# Patient Record
Sex: MALE | Race: WHITE
[De-identification: names, ages, dates, MRNs, and addresses within clinical notes are randomized per-mention and may not be internally consistent; named-entity substitution may affect disease eponyms.]

---

## 2018-01-05 ENCOUNTER — HOSPITAL ENCOUNTER (INPATIENT)
Dept: HOSPITAL 92 - ERS | Age: 63
LOS: 2 days | Discharge: HOME | DRG: 189 | End: 2018-01-07
Attending: INTERNAL MEDICINE | Admitting: INTERNAL MEDICINE
Payer: MEDICARE

## 2018-01-05 VITALS — BODY MASS INDEX: 51.5 KG/M2

## 2018-01-05 DIAGNOSIS — I25.10: ICD-10-CM

## 2018-01-05 DIAGNOSIS — Z95.5: ICD-10-CM

## 2018-01-05 DIAGNOSIS — E87.5: ICD-10-CM

## 2018-01-05 DIAGNOSIS — E66.01: ICD-10-CM

## 2018-01-05 DIAGNOSIS — G62.9: ICD-10-CM

## 2018-01-05 DIAGNOSIS — J96.21: Primary | ICD-10-CM

## 2018-01-05 DIAGNOSIS — G47.33: ICD-10-CM

## 2018-01-05 DIAGNOSIS — E11.9: ICD-10-CM

## 2018-01-05 DIAGNOSIS — I11.0: ICD-10-CM

## 2018-01-05 DIAGNOSIS — I50.32: ICD-10-CM

## 2018-01-05 DIAGNOSIS — F17.210: ICD-10-CM

## 2018-01-05 DIAGNOSIS — J44.1: ICD-10-CM

## 2018-01-05 DIAGNOSIS — Z99.81: ICD-10-CM

## 2018-01-05 LAB
ALBUMIN SERPL BCG-MCNC: 4 G/DL (ref 3.4–4.8)
ALP SERPL-CCNC: 78 U/L (ref 40–150)
ALT SERPL W P-5'-P-CCNC: 23 U/L (ref 8–55)
ANION GAP SERPL CALC-SCNC: 17 MMOL/L (ref 10–20)
AST SERPL-CCNC: 17 U/L (ref 5–34)
BASOPHILS # BLD AUTO: 0 THOU/UL (ref 0–0.2)
BASOPHILS NFR BLD AUTO: 0 % (ref 0–1)
BILIRUB SERPL-MCNC: 0.4 MG/DL (ref 0.2–1.2)
BUN SERPL-MCNC: 22 MG/DL (ref 8.4–25.7)
CALCIUM SERPL-MCNC: 9.5 MG/DL (ref 7.8–10.44)
CHLORIDE SERPL-SCNC: 103 MMOL/L (ref 98–107)
CK MB SERPL-MCNC: 2.4 NG/ML (ref 0–6.6)
CO2 SERPL-SCNC: 21 MMOL/L (ref 23–31)
CREAT CL PREDICTED SERPL C-G-VRATE: 0 ML/MIN (ref 70–130)
EOSINOPHIL # BLD AUTO: 0 THOU/UL (ref 0–0.7)
EOSINOPHIL NFR BLD AUTO: 0.2 % (ref 0–10)
GLOBULIN SER CALC-MCNC: 2.7 G/DL (ref 2.4–3.5)
GLUCOSE SERPL-MCNC: 314 MG/DL (ref 80–115)
HGB BLD-MCNC: 14.2 G/DL (ref 14–18)
LIPASE SERPL-CCNC: 25 U/L (ref 8–78)
LYMPHOCYTES # BLD: 0.5 THOU/UL (ref 1.2–3.4)
LYMPHOCYTES NFR BLD AUTO: 5.6 % (ref 21–51)
MCH RBC QN AUTO: 26.7 PG (ref 27–31)
MCV RBC AUTO: 81.9 FL (ref 80–94)
MONOCYTES # BLD AUTO: 0.1 THOU/UL (ref 0.11–0.59)
MONOCYTES NFR BLD AUTO: 1.1 % (ref 0–10)
NEUTROPHILS # BLD AUTO: 8.2 THOU/UL (ref 1.4–6.5)
NEUTROPHILS NFR BLD AUTO: 93 % (ref 42–75)
PLATELET # BLD AUTO: 175 THOU/UL (ref 130–400)
POTASSIUM SERPL-SCNC: 3.7 MMOL/L (ref 3.5–5.1)
RBC # BLD AUTO: 5.34 MILL/UL (ref 4.7–6.1)
SODIUM SERPL-SCNC: 137 MMOL/L (ref 136–145)
TROPONIN I SERPL DL<=0.01 NG/ML-MCNC: 0.02 NG/ML (ref ?–0.03)
WBC # BLD AUTO: 8.8 THOU/UL (ref 4.8–10.8)

## 2018-01-05 PROCEDURE — 87040 BLOOD CULTURE FOR BACTERIA: CPT

## 2018-01-05 PROCEDURE — 71045 X-RAY EXAM CHEST 1 VIEW: CPT

## 2018-01-05 PROCEDURE — 36416 COLLJ CAPILLARY BLOOD SPEC: CPT

## 2018-01-05 PROCEDURE — 83690 ASSAY OF LIPASE: CPT

## 2018-01-05 PROCEDURE — 85025 COMPLETE CBC W/AUTO DIFF WBC: CPT

## 2018-01-05 PROCEDURE — A4216 STERILE WATER/SALINE, 10 ML: HCPCS

## 2018-01-05 PROCEDURE — 80048 BASIC METABOLIC PNL TOTAL CA: CPT

## 2018-01-05 PROCEDURE — 83880 ASSAY OF NATRIURETIC PEPTIDE: CPT

## 2018-01-05 PROCEDURE — 87804 INFLUENZA ASSAY W/OPTIC: CPT

## 2018-01-05 PROCEDURE — 82553 CREATINE MB FRACTION: CPT

## 2018-01-05 PROCEDURE — 94640 AIRWAY INHALATION TREATMENT: CPT

## 2018-01-05 PROCEDURE — 84484 ASSAY OF TROPONIN QUANT: CPT

## 2018-01-05 PROCEDURE — 80053 COMPREHEN METABOLIC PANEL: CPT

## 2018-01-05 PROCEDURE — 36415 COLL VENOUS BLD VENIPUNCTURE: CPT

## 2018-01-05 PROCEDURE — 93005 ELECTROCARDIOGRAM TRACING: CPT

## 2018-01-05 PROCEDURE — 99406 BEHAV CHNG SMOKING 3-10 MIN: CPT

## 2018-01-05 RX ADMIN — HYDROCODONE BITARTRATE AND ACETAMINOPHEN PRN TAB: 5; 325 TABLET ORAL at 22:37

## 2018-01-05 RX ADMIN — AZITHROMYCIN SCH MLS: 500 INJECTION, POWDER, LYOPHILIZED, FOR SOLUTION INTRAVENOUS at 22:10

## 2018-01-05 RX ADMIN — HEPARIN SODIUM SCH UNITS: 5000 INJECTION, SOLUTION INTRAVENOUS; SUBCUTANEOUS at 22:09

## 2018-01-05 RX ADMIN — CEFTRIAXONE SCH MLS: 1 INJECTION, POWDER, FOR SOLUTION INTRAMUSCULAR; INTRAVENOUS at 22:03

## 2018-01-05 NOTE — HP
REASON FOR ADMISSION:  COPD exacerbation, acute respiratory failure with 
hypoxia.

 

HISTORY OF PRESENTING ILLNESS:  The patient gives history of having worsening 
cough with wheezing from last 2 days.  This morning, it got worse.  The home 
health nurse came to check on him and told him to call his pulmonologist, Dr. Carroll.  Dr. Carroll was on vacation and the office told him to come to the 
Emergency Room.  He is normally on 2 L oxygen at home.  He uses CPAP at night.  
He has cough with expectoration of white phlegm.  He has not had any fever at 
home.  He has taken his flu shot for this year.  He uses electric chair and 
walks with a rolling walker for minimal distences inside the house.  He stays 
with his wife.  He has had some chest discomfort due to coughing episodes.

 

PAST MEDICAL AND SURGICAL HISTORY:  History of chronic respiratory failure with 
COPD, on home oxygen; CHF with diastolic dysfunction, morbid obesity, 
obstructive sleep apnea, on CPAP, has a LINQ recorder, history of toxoid in the 
past, coronary artery disease with prior stent and PTCA done to LAD in 2016, 
diabetes mellitus type 2 requiring insulin; chronic back pain, on narcotics; 
severe peripheral neuropathy, the reason for him to be wheelchair bound; 
hypertension, history of MI in , sinus surgery, cardiac cath x2.

 

CURRENT MEDICATIONS:  The patient takes Norvasc 10 mg p.o. daily, Singulair 10 
mg p.o. at bedtime, DuoNebs 4 times daily, hydralazine 25 mg twice daily, 
aspirin 81 mg daily, glipizide 10 mg twice daily, alendronate 70 mg p.o. once 
weekly, MiraLax 17 g daily, Lasix 40 mg twice daily, Prozac 40 mg q.a.m., 
lisinopril 10 mg daily, buspirone 5 mg twice daily, potassium chloride 20 mEq 
twice daily, spironolactone 12.5 mg twice daily, Restoril 30 mg p.o. at bedtime
, Protonix 40 mg daily, Zyrtec 10 mg p.o. daily, fluticasone nasal spray, 
Tessalon 100 mg 3 times daily, Lantus 27 units subcu daily, gabapentin 300 mg 3 
times daily, theophylline extended release 200 mg twice daily, Ultram p.r.n. 
twice dairy, Advair Diskus 500/50 mcg one puff twice daily.

 

ALLERGIES:  FLOMAX AND LEVAQUIN.

 

PERSONAL HISTORY:  Quit smoking in .  Does not abuse alcohol or drugs.  He 
lives with his wife.

 

FAMILY HISTORY:  Mom  in her 70s, she  of stroke and its complications.
  She has also had coronary artery disease.  Father  in his 60s, he has had 
COPD, heart failure, and hypertension.  Had older brother who  of COPD and 
its complications at the age of 63 years.

 

REVIEW OF SYSTEMS:  The following complete review of systems was

negative, unless otherwise mentioned in the HPI or below:

Constitutional:  Weight loss or gain, ability to conduct usual activities.

Skin:  Rash, itching.

Eyes:  Double vision, pain.

ENT/Mouth:  Nose bleeding, neck stiffness, pain, tenderness. 

Cardiovascular:  Palpitations, dyspnea on exertion, orthopnea.

 Respiratory:  Shortness of breath, wheezing, cough, hemoptysis, fever or night 
sweats.

Gastrointestinal:  Poor appetite, abdominal pain, heartburn, nausea, vomiting, 
constipation, or diarrhea.

Genitourinary:  Urgency, frequency, dysuria, nocturia.

Musculoskeletal:  Pain, swelling.

Neurologic/Psychiatric:  Anxiety, depression.

Allergy/Immunologic:  Skin rash, bleeding tendency.

 

PHYSICAL EXAMINATION:

GENERAL:  The patient is a 62-year-old male who is currently wheezing.

VITAL SIGNS:  Blood pressure 156/90, pulse 100 per minute, respiratory rate 24 
per minute, temperature 98.5 degrees Fahrenheit, and saturating 96% on 3 L 
nasal cannula.

NECK:  Supple.  No elevated JVD.

EYES:  Extraocular muscles are intact.  Pupils are reacting to light.  Oral 
cavity, mucous membranes are moist.  No exudate or congestion.

CARDIOVASCULAR SYSTEM:  S1, S2 heard.  Regular rhythm.

RESPIRATORY SYSTEM:  Air entry 1+ bilateral.  Scattered wheezes plus bilateral.

ABDOMEN:  Soft, bowel sounds heard.  No tenderness, rigidity or guarding.

EXTREMITIES:  There is 2+ peripheral edema.  No calf tenderness.

VASCULAR SYSTEM:  Peripheral pulses 1+ bilateral.  No ischemic ulcerations or 
gangrene.

CENTRAL NERVOUS SYSTEM:  No gross focal deficits seen.  The patient is alert, 
awake, oriented well.

PSYCHIATRIC SYSTEM:  The patient's mood is euthymic.  No hallucinations or 
delusions.

 

LABORATORY DATA AND X-RAY FINDINGS:  EKG done shows normal sinus rhythm at 98 
beats per minute with nonspecific ST-T wave changes.  White count of 8, 
hemoglobin and hematocrit 14 and 43, platelet count is 175, MCV is 81 with 93% 
neutrophils.  BUN 22, creatinine 1.6, glucose 314.  Serum bicarbonate 21.  
Liver enzymes within normal limits.  BNP is 70 and troponin I is within normal 
limits.  Lipase is 25.  Influenza A and B antigens are negative.  Chest x-ray 
done shows no acute infiltrate.

 

CLINICAL IMPRESSION AND PLAN:  The patient will be admitted to telemetry for 
acute on chronic obstructive pulmonary disease exacerbation, acute on chronic 
respiratory failure with hypoxia.  He will be placed on ceftriaxone and 
Zithromax as the patient is allergic to LEVAQUIN.  He will also be on Solu-
Medrol 40 mg IV q.6 hourly and DuoNebs q.6 hourly.  We will continue 
theophylline extended release as before.  We will continue aspirin, Plavix, 
Lipitor, Norvasc, buspirone, Lasix with potassium, gabapentin, Singulair, 
MiraLax as before.  His Restoril will be held in view of his lung condition at 
present.  We will place him on Levemir 40 units subcu at bedtime for now as the 
patient will be on steroids.  We will consult Dr. Davison who is on call for 
Dr. Carroll for Pulmonology.

 

[facility]

## 2018-01-05 NOTE — RAD
PORTABLE AP CHEST X-RAY

1/5/18

 

HISTORY: 

Dyspnea. Chest wall pain that started yesterday at work. Pain is increased with deep breathing and ar
m movement.

 

COMPARISON:  

9/18/17.

 

FINDINGS:  

A loop recorder again overlies the left mid chest. The cardiac silhouette and pulmonary vasculature a
re within normal limits. The lungs are clear. there has been no interval change from the prior exam. 


 

IMPRESSION:  

No acute cardiopulmonary process. 

 

POS: [facility]

## 2018-01-06 LAB
ALBUMIN SERPL BCG-MCNC: 3.9 G/DL (ref 3.4–4.8)
ALP SERPL-CCNC: 76 U/L (ref 40–150)
ALT SERPL W P-5'-P-CCNC: 22 U/L (ref 8–55)
ANION GAP SERPL CALC-SCNC: 22 MMOL/L (ref 10–20)
AST SERPL-CCNC: 25 U/L (ref 5–34)
BASOPHILS # BLD AUTO: 0 THOU/UL (ref 0–0.2)
BASOPHILS NFR BLD AUTO: 0 % (ref 0–1)
BILIRUB SERPL-MCNC: 0.4 MG/DL (ref 0.2–1.2)
BUN SERPL-MCNC: 25 MG/DL (ref 8.4–25.7)
CALCIUM SERPL-MCNC: 9.3 MG/DL (ref 7.8–10.44)
CHLORIDE SERPL-SCNC: 104 MMOL/L (ref 98–107)
CO2 SERPL-SCNC: 16 MMOL/L (ref 23–31)
CREAT CL PREDICTED SERPL C-G-VRATE: 95 ML/MIN (ref 70–130)
EOSINOPHIL # BLD AUTO: 0.1 THOU/UL (ref 0–0.7)
EOSINOPHIL NFR BLD AUTO: 1.3 % (ref 0–10)
GLOBULIN SER CALC-MCNC: 3.3 G/DL (ref 2.4–3.5)
GLUCOSE SERPL-MCNC: 339 MG/DL (ref 80–115)
HGB BLD-MCNC: 13.9 G/DL (ref 14–18)
LYMPHOCYTES # BLD: 0.6 THOU/UL (ref 1.2–3.4)
LYMPHOCYTES NFR BLD AUTO: 6.6 % (ref 21–51)
MCH RBC QN AUTO: 25.6 PG (ref 27–31)
MCV RBC AUTO: 84.4 FL (ref 80–94)
MONOCYTES # BLD AUTO: 0.1 THOU/UL (ref 0.11–0.59)
MONOCYTES NFR BLD AUTO: 1.2 % (ref 0–10)
NEUTROPHILS # BLD AUTO: 8 THOU/UL (ref 1.4–6.5)
NEUTROPHILS NFR BLD AUTO: 90.9 % (ref 42–75)
PLATELET # BLD AUTO: 218 THOU/UL (ref 130–400)
POTASSIUM SERPL-SCNC: 4.6 MMOL/L (ref 3.5–5.1)
RBC # BLD AUTO: 5.41 MILL/UL (ref 4.7–6.1)
SODIUM SERPL-SCNC: 137 MMOL/L (ref 136–145)
WBC # BLD AUTO: 8.8 THOU/UL (ref 4.8–10.8)

## 2018-01-06 RX ADMIN — INSULIN LISPRO PRN UNITS: 100 INJECTION, SOLUTION INTRAVENOUS; SUBCUTANEOUS at 13:07

## 2018-01-06 RX ADMIN — THEOPHYLLINE ANHYDROUS SCH MG: 80 LIQUID ORAL at 22:40

## 2018-01-06 RX ADMIN — INSULIN LISPRO PRN UNITS: 100 INJECTION, SOLUTION INTRAVENOUS; SUBCUTANEOUS at 08:03

## 2018-01-06 RX ADMIN — HEPARIN SODIUM SCH UNITS: 5000 INJECTION, SOLUTION INTRAVENOUS; SUBCUTANEOUS at 14:38

## 2018-01-06 RX ADMIN — HEPARIN SODIUM SCH UNITS: 5000 INJECTION, SOLUTION INTRAVENOUS; SUBCUTANEOUS at 08:02

## 2018-01-06 RX ADMIN — HEPARIN SODIUM SCH UNITS: 5000 INJECTION, SOLUTION INTRAVENOUS; SUBCUTANEOUS at 22:40

## 2018-01-06 RX ADMIN — THEOPHYLLINE ANHYDROUS SCH MG: 80 LIQUID ORAL at 10:13

## 2018-01-06 RX ADMIN — HYDROCODONE BITARTRATE AND ACETAMINOPHEN PRN TAB: 5; 325 TABLET ORAL at 11:21

## 2018-01-06 RX ADMIN — AZITHROMYCIN SCH MLS: 500 INJECTION, POWDER, LYOPHILIZED, FOR SOLUTION INTRAVENOUS at 22:40

## 2018-01-06 RX ADMIN — CEFTRIAXONE SCH MLS: 1 INJECTION, POWDER, FOR SOLUTION INTRAMUSCULAR; INTRAVENOUS at 22:39

## 2018-01-06 RX ADMIN — INSULIN LISPRO PRN UNITS: 100 INJECTION, SOLUTION INTRAVENOUS; SUBCUTANEOUS at 17:21

## 2018-01-06 NOTE — EKG
Test Reason : 

Blood Pressure : ***/*** mmHG

Vent. Rate : 098 BPM     Atrial Rate : 098 BPM

   P-R Int : 122 ms          QRS Dur : 092 ms

    QT Int : 394 ms       P-R-T Axes : 033 002 024 degrees

   QTc Int : 503 ms

 

Sinus rhythm with Premature atrial complexes

Possible Left atrial enlargement

Prolonged QT

Abnormal ECG

 

Confirmed by RE GARCIA, RADHA ([id number]),  SHAY THURSTON ([id number]) on 1/6/2018 3:39:04 PM

 

Referred By:             Confirmed By:RADHA GRIMALDO MD

## 2018-01-06 NOTE — PDOC.PN
- Subjective


Encounter Start Date: 01/06/18


Encounter Start Time: 12:00





Mr. Moya says he feels much better. He says he is breathing easier, and his 

cough has improved. He says he had trouble sleeping, and would like his 

sleeping medication tonight.





- Objective


Resuscitation Status: 


 











Resuscitation Status           FULL:Full Resuscitation














MAR Reviewed: Yes


Vital Signs & Weight: 


 Vital Signs (12 hours)











  Temp Pulse Resp BP Pulse Ox


 


 01/06/18 08:01   103 H   


 


 01/06/18 07:54  97.7 F  103 H  20  177/91 H  90 L


 


 01/06/18 07:10      97


 


 01/06/18 07:07   95  20   97


 


 01/06/18 04:00  97.8 F  103 H  16  172/105 H  91 L


 


 01/06/18 00:02   100  20   97








 Weight











Weight                         329 lb














I&O: 


 











 01/05/18 01/06/18 01/07/18





 06:59 06:59 06:59


 


Intake Total  240 


 


Output Total  1500 


 


Balance  -1260 











Result Diagrams: 


 01/06/18 04:06





 01/06/18 04:06


Additional Labs: 


 Accuchecks











  01/06/18 01/05/18





  05:59 21:52


 


POC Glucose  276 H  385 H














Phys Exam





- Physical Examination


HEENT: PERRLA


Respiratory: no wheezing


decreased air movement


Cardiovascular: RRR, no significant murmur


Gastrointestinal: soft, non-tender, positive bowel sounds


Musculoskeletal: edema present


2+ edema, bilaterally with chronic venous stasis changes





Dx/Plan


(1) Acute and chronic respiratory failure with hypoxia


Code(s): J96.21 - ACUTE AND CHRONIC RESPIRATORY FAILURE WITH HYPOXIA   Status: 

Acute   Comment: Continue pulmonary support, Duonebs, Spiriva, Symbicort, 

Solumedrol, O2, Rocephin d/c'd, Doxycycline 100mg BID   





(2) COPD exacerbation


Code(s): J44.1 - CHRONIC OBSTRUCTIVE PULMONARY DISEASE W (ACUTE) EXACERBATION   

Status: Chronic   Comment: See #1   





(3) DM type 2 (diabetes mellitus, type 2)


Status: Chronic   





(4) Hypertension


Code(s): I10 - ESSENTIAL (PRIMARY) HYPERTENSION   Status: Chronic   


Qualifiers: 


 





(5) Morbid obesity


Code(s): E66.01 - MORBID (SEVERE) OBESITY DUE TO EXCESS CALORIES   Status: 

Chronic   





- Plan





* Acute on chronic respiratory failure with hypoxemia due to COPD exacerbation- 

improved overnight


* Will change to oral steroids, and re-assess in the AM


* Continue Rocephin and Azithromycin, as well as Duonebs


* DM- blood glucose is elevated- likely from steroids- will re-start Glyburide


* HTN- blood pressure is elevated- will  re-start Hydralazine and monitor.

## 2018-01-07 VITALS — TEMPERATURE: 97.9 F

## 2018-01-07 VITALS — SYSTOLIC BLOOD PRESSURE: 167 MMHG | DIASTOLIC BLOOD PRESSURE: 93 MMHG

## 2018-01-07 LAB
ANION GAP SERPL CALC-SCNC: 14 MMOL/L (ref 10–20)
BASOPHILS # BLD AUTO: 0 THOU/UL (ref 0–0.2)
BASOPHILS NFR BLD AUTO: 0.1 % (ref 0–1)
BUN SERPL-MCNC: 30 MG/DL (ref 8.4–25.7)
CALCIUM SERPL-MCNC: 9 MG/DL (ref 7.8–10.44)
CHLORIDE SERPL-SCNC: 106 MMOL/L (ref 98–107)
CO2 SERPL-SCNC: 24 MMOL/L (ref 23–31)
CREAT CL PREDICTED SERPL C-G-VRATE: 99 ML/MIN (ref 70–130)
EOSINOPHIL # BLD AUTO: 0 THOU/UL (ref 0–0.7)
EOSINOPHIL NFR BLD AUTO: 0.1 % (ref 0–10)
GLUCOSE SERPL-MCNC: 142 MG/DL (ref 80–115)
HGB BLD-MCNC: 13.1 G/DL (ref 14–18)
LYMPHOCYTES # BLD: 1.5 THOU/UL (ref 1.2–3.4)
LYMPHOCYTES NFR BLD AUTO: 11.6 % (ref 21–51)
MCH RBC QN AUTO: 25.9 PG (ref 27–31)
MCV RBC AUTO: 82.4 FL (ref 80–94)
MONOCYTES # BLD AUTO: 1 THOU/UL (ref 0.11–0.59)
MONOCYTES NFR BLD AUTO: 7.7 % (ref 0–10)
NEUTROPHILS # BLD AUTO: 10.6 THOU/UL (ref 1.4–6.5)
NEUTROPHILS NFR BLD AUTO: 80.5 % (ref 42–75)
PLATELET # BLD AUTO: 200 THOU/UL (ref 130–400)
POTASSIUM SERPL-SCNC: 3.2 MMOL/L (ref 3.5–5.1)
RBC # BLD AUTO: 5.05 MILL/UL (ref 4.7–6.1)
SODIUM SERPL-SCNC: 141 MMOL/L (ref 136–145)
WBC # BLD AUTO: 13.2 THOU/UL (ref 4.8–10.8)

## 2018-01-07 RX ADMIN — HEPARIN SODIUM SCH UNITS: 5000 INJECTION, SOLUTION INTRAVENOUS; SUBCUTANEOUS at 09:29

## 2018-01-07 RX ADMIN — THEOPHYLLINE ANHYDROUS SCH MG: 80 LIQUID ORAL at 14:29

## 2018-01-07 RX ADMIN — HEPARIN SODIUM SCH UNITS: 5000 INJECTION, SOLUTION INTRAVENOUS; SUBCUTANEOUS at 14:31

## 2018-01-07 NOTE — DIS
PRIMARY CARE PHYSICIAN:  Stepahn Garcia D.O.

 

DATE OF ADMISSION:  01/05/2018

 

DATE OF DISCHARGE:  01/07/2018

 

DISCHARGE DISPOSITION:  Home.

 

PRIMARY DISCHARGE DIAGNOSES:

1.  Chronic obstructive pulmonary disease exacerbation.

2.  Diabetes mellitus, type 2.

3.  Obstructive sleep apnea on CPAP.

4.  History of coronary artery disease with stent.

5.  Severe peripheral neuropathy.

6.  Chronic diastolic heart failure and the patient also has history of chronic respiratory failure w
ith hypoxemia on home O2.

7.  Morbid obesity.

 

CODE STATUS:  FULL CODE.

 

ALLERGIES:  LEVAQUIN and FLOMAX.

 

HOSPITAL COURSE:  Mr. Moya is a pleasant 62-year-old gentleman who came to the emergency room with c
omplaints of shortness of breath.  He was found to be hypoxic.  He had been trying to treat himself a
t home without relief.  As a result, he came to the emergency room where he was admitted with a COPD 
exacerbation.  Chest x-ray showed no acute pulmonary process such as pneumonia.  He was treated with 
empiric IV antibiotics as well as IV steroids and DuoNebs and cough suppressants.  He was improved af
ter a few days in the hospital and was subsequently able to be discharged home; however, he will need
 close outpatient follow up hopefully within the week as he does have a history of severe COPD and to
 follow up again within 1-2 days with his primary care physician.

## 2018-01-07 NOTE — PDOC.PN
- Subjective


Encounter Start Date: 01/07/18


Encounter Start Time: 13:59





 was seen today in follow-up of Acute on chronic respiratory failure. 

He says he feels much better. He feels he is at his baseline.





- Objective


Resuscitation Status: 


 











Resuscitation Status           FULL:Full Resuscitation














MAR Reviewed: Yes


Vital Signs & Weight: 


 Vital Signs (12 hours)











  Temp Pulse Resp BP BP Pulse Ox


 


 01/07/18 09:27   87   167/93 H  


 


 01/07/18 09:26   87   167/93 H  


 


 01/07/18 08:00  97.9 F  87  22 H   167/93 H  96


 


 01/07/18 04:35  97.9 F  87  20   164/102 H  94 L








 Weight











Weight                         329 lb














I&O: 


 











 01/06/18 01/07/18 01/08/18





 06:59 06:59 06:59


 


Intake Total 1720  


 


Output Total 2200  


 


Balance -480  











Result Diagrams: 


 01/07/18 03:55





 01/07/18 03:55


Additional Labs: 


 Accuchecks











  01/07/18 01/07/18 01/06/18





  12:25 04:34 16:57


 


POC Glucose  117 H  130 H  280 H














Phys Exam





- Physical Examination


HEENT: PERRLA


Respiratory: no rales, wheezing present


Cardiovascular: RRR, no significant murmur, no rub


Gastrointestinal: soft, non-tender, positive bowel sounds


Musculoskeletal: edema present


trace pedal edema





Dx/Plan


(1) Acute and chronic respiratory failure with hypoxia


Code(s): J96.21 - ACUTE AND CHRONIC RESPIRATORY FAILURE WITH HYPOXIA   Status: 

Acute   Comment: Continue pulmonary support, Duonebs, Spiriva, Symbicort, 

Solumedrol, O2, Rocephin d/c'd, Doxycycline 100mg BID   





(2) COPD exacerbation


Code(s): J44.1 - CHRONIC OBSTRUCTIVE PULMONARY DISEASE W (ACUTE) EXACERBATION   

Status: Chronic   Comment: See #1   





(3) DM type 2 (diabetes mellitus, type 2)


Status: Chronic   





(4) Hypertension


Code(s): I10 - ESSENTIAL (PRIMARY) HYPERTENSION   Status: Chronic   


Qualifiers: 


 





(5) Morbid obesity


Code(s): E66.01 - MORBID (SEVERE) OBESITY DUE TO EXCESS CALORIES   Status: 

Chronic   





- Plan





* Acute on chronic respiratory failure- improving


* DM- blood glucose is stable


* He can be transitioned to oral antibiotics, and plan on discharge home today 

with close Outpatient Follow-up..

## 2018-01-31 ENCOUNTER — HOSPITAL ENCOUNTER (INPATIENT)
Dept: HOSPITAL 92 - ERS | Age: 63
LOS: 6 days | Discharge: HOME HEALTH | DRG: 871 | End: 2018-02-06
Attending: INTERNAL MEDICINE | Admitting: INTERNAL MEDICINE
Payer: MEDICARE

## 2018-01-31 VITALS — BODY MASS INDEX: 52.2 KG/M2

## 2018-01-31 DIAGNOSIS — E87.6: ICD-10-CM

## 2018-01-31 DIAGNOSIS — G47.33: ICD-10-CM

## 2018-01-31 DIAGNOSIS — I27.20: ICD-10-CM

## 2018-01-31 DIAGNOSIS — K56.7: ICD-10-CM

## 2018-01-31 DIAGNOSIS — K59.09: ICD-10-CM

## 2018-01-31 DIAGNOSIS — I50.33: ICD-10-CM

## 2018-01-31 DIAGNOSIS — Z87.891: ICD-10-CM

## 2018-01-31 DIAGNOSIS — E87.1: ICD-10-CM

## 2018-01-31 DIAGNOSIS — G89.4: ICD-10-CM

## 2018-01-31 DIAGNOSIS — Z95.5: ICD-10-CM

## 2018-01-31 DIAGNOSIS — M54.9: ICD-10-CM

## 2018-01-31 DIAGNOSIS — J13: ICD-10-CM

## 2018-01-31 DIAGNOSIS — N18.3: ICD-10-CM

## 2018-01-31 DIAGNOSIS — E66.01: ICD-10-CM

## 2018-01-31 DIAGNOSIS — Z99.81: ICD-10-CM

## 2018-01-31 DIAGNOSIS — E11.40: ICD-10-CM

## 2018-01-31 DIAGNOSIS — J44.1: ICD-10-CM

## 2018-01-31 DIAGNOSIS — A40.3: Primary | ICD-10-CM

## 2018-01-31 DIAGNOSIS — J96.10: ICD-10-CM

## 2018-01-31 DIAGNOSIS — E11.22: ICD-10-CM

## 2018-01-31 DIAGNOSIS — I13.0: ICD-10-CM

## 2018-01-31 DIAGNOSIS — I25.10: ICD-10-CM

## 2018-01-31 LAB
ALBUMIN SERPL BCG-MCNC: 3.9 G/DL (ref 3.4–4.8)
ALP SERPL-CCNC: 80 U/L (ref 40–150)
ALT SERPL W P-5'-P-CCNC: 16 U/L (ref 8–55)
ANALYZER IN CARDIO: (no result)
ANION GAP SERPL CALC-SCNC: 17 MMOL/L (ref 10–20)
APTT PPP: 30.4 SEC (ref 22.9–36.1)
AST SERPL-CCNC: 13 U/L (ref 5–34)
BACTERIA UR QL AUTO: (no result) HPF
BASE EXCESS STD BLDA CALC-SCNC: 0.1 MEQ/L
BASOPHILS # BLD AUTO: 0.1 THOU/UL (ref 0–0.2)
BASOPHILS NFR BLD AUTO: 0.4 % (ref 0–1)
BILIRUB SERPL-MCNC: 0.6 MG/DL (ref 0.2–1.2)
BUN SERPL-MCNC: 19 MG/DL (ref 8.4–25.7)
CA-I BLDA-SCNC: 1.2 MMOL/L (ref 1.12–1.3)
CALCIUM SERPL-MCNC: 9.2 MG/DL (ref 7.8–10.44)
CHLORIDE SERPL-SCNC: 99 MMOL/L (ref 98–107)
CK MB SERPL-MCNC: 1.6 NG/ML (ref 0–6.6)
CK SERPL-CCNC: 255 U/L (ref 30–200)
CO2 SERPL-SCNC: 23 MMOL/L (ref 23–31)
CREAT CL PREDICTED SERPL C-G-VRATE: 0 ML/MIN (ref 70–130)
CRYSTAL-AUWI FLAG: 0 (ref 0–15)
EOSINOPHIL # BLD AUTO: 0.1 THOU/UL (ref 0–0.7)
EOSINOPHIL NFR BLD AUTO: 0.3 % (ref 0–10)
GLOBULIN SER CALC-MCNC: 2.9 G/DL (ref 2.4–3.5)
GLUCOSE SERPL-MCNC: 371 MG/DL (ref 80–115)
GLUCOSE UR STRIP-MCNC: 500 MG/DL
HCO3 BLDA-SCNC: 24.2 MEQ/L (ref 22–26)
HCT VFR BLDA CALC: 47.8 % (ref 42–52)
HEV IGM SER QL: 0.6 (ref 0–7.99)
HGB BLD-MCNC: 14.6 G/DL (ref 14–18)
HGB BLDA-MCNC: 14.3 G/DL (ref 14–18)
HYALINE CASTS #/AREA URNS LPF: (no result) LPF
INR PPP: 1
LIPASE SERPL-CCNC: 27 U/L (ref 8–78)
LYMPHOCYTES # BLD: 1.7 THOU/UL (ref 1.2–3.4)
LYMPHOCYTES NFR BLD AUTO: 9.2 % (ref 21–51)
MCH RBC QN AUTO: 27.2 PG (ref 27–31)
MCV RBC AUTO: 83.6 FL (ref 80–94)
MONOCYTES # BLD AUTO: 1 THOU/UL (ref 0.11–0.59)
MONOCYTES NFR BLD AUTO: 5.3 % (ref 0–10)
NEUTROPHILS # BLD AUTO: 15.8 THOU/UL (ref 1.4–6.5)
NEUTROPHILS NFR BLD AUTO: 84.9 % (ref 42–75)
PATHC CAST-AUWI FLAG: 0 (ref 0–2.49)
PCO2 BLDA: 37.6 MMHG (ref 35–45)
PH BLDA: 7.43 [PH] (ref 7.35–7.45)
PLATELET # BLD AUTO: 195 THOU/UL (ref 130–400)
PO2 BLDA: 85.3 MMHG (ref 80–100)
POTASSIUM SERPL-SCNC: 3.9 MMOL/L (ref 3.5–5.1)
PROT UR STRIP.AUTO-MCNC: 30 MG/DL
PROTHROMBIN TIME: 12.9 SEC (ref 12–14.7)
RBC # BLD AUTO: 5.36 MILL/UL (ref 4.7–6.1)
SODIUM SERPL-SCNC: 135 MMOL/L (ref 136–145)
SP GR UR STRIP: 1.01 (ref 1–1.04)
SPECIMEN DRAWN FROM PATIENT: (no result)
SPERM-AUWI FLAG: 0 (ref 0–9.9)
TROPONIN I SERPL DL<=0.01 NG/ML-MCNC: 0.04 NG/ML (ref ?–0.03)
TROPONIN I SERPL DL<=0.01 NG/ML-MCNC: 0.04 NG/ML (ref ?–0.03)
WBC # BLD AUTO: 18.6 THOU/UL (ref 4.8–10.8)
WBC UR QL AUTO: (no result) HPF (ref 0–3)
YEAST-AUWI FLAG: 0 (ref 0–25)

## 2018-01-31 PROCEDURE — 96375 TX/PRO/DX INJ NEW DRUG ADDON: CPT

## 2018-01-31 PROCEDURE — 80048 BASIC METABOLIC PNL TOTAL CA: CPT

## 2018-01-31 PROCEDURE — 94640 AIRWAY INHALATION TREATMENT: CPT

## 2018-01-31 PROCEDURE — 80053 COMPREHEN METABOLIC PANEL: CPT

## 2018-01-31 PROCEDURE — 83690 ASSAY OF LIPASE: CPT

## 2018-01-31 PROCEDURE — 93970 EXTREMITY STUDY: CPT

## 2018-01-31 PROCEDURE — 85018 HEMOGLOBIN: CPT

## 2018-01-31 PROCEDURE — 81003 URINALYSIS AUTO W/O SCOPE: CPT

## 2018-01-31 PROCEDURE — 83605 ASSAY OF LACTIC ACID: CPT

## 2018-01-31 PROCEDURE — 74018 RADEX ABDOMEN 1 VIEW: CPT

## 2018-01-31 PROCEDURE — 80069 RENAL FUNCTION PANEL: CPT

## 2018-01-31 PROCEDURE — 36415 COLL VENOUS BLD VENIPUNCTURE: CPT

## 2018-01-31 PROCEDURE — 87040 BLOOD CULTURE FOR BACTERIA: CPT

## 2018-01-31 PROCEDURE — 36416 COLLJ CAPILLARY BLOOD SPEC: CPT

## 2018-01-31 PROCEDURE — 74019 RADEX ABDOMEN 2 VIEWS: CPT

## 2018-01-31 PROCEDURE — 85610 PROTHROMBIN TIME: CPT

## 2018-01-31 PROCEDURE — 82553 CREATINE MB FRACTION: CPT

## 2018-01-31 PROCEDURE — 85049 AUTOMATED PLATELET COUNT: CPT

## 2018-01-31 PROCEDURE — 85014 HEMATOCRIT: CPT

## 2018-01-31 PROCEDURE — 84484 ASSAY OF TROPONIN QUANT: CPT

## 2018-01-31 PROCEDURE — 83880 ASSAY OF NATRIURETIC PEPTIDE: CPT

## 2018-01-31 PROCEDURE — 96365 THER/PROPH/DIAG IV INF INIT: CPT

## 2018-01-31 PROCEDURE — 93306 TTE W/DOPPLER COMPLETE: CPT

## 2018-01-31 PROCEDURE — A4216 STERILE WATER/SALINE, 10 ML: HCPCS

## 2018-01-31 PROCEDURE — 85025 COMPLETE CBC W/AUTO DIFF WBC: CPT

## 2018-01-31 PROCEDURE — 93005 ELECTROCARDIOGRAM TRACING: CPT

## 2018-01-31 PROCEDURE — 82805 BLOOD GASES W/O2 SATURATION: CPT

## 2018-01-31 PROCEDURE — 81015 MICROSCOPIC EXAM OF URINE: CPT

## 2018-01-31 PROCEDURE — 85730 THROMBOPLASTIN TIME PARTIAL: CPT

## 2018-01-31 PROCEDURE — 93798 PHYS/QHP OP CAR RHAB W/ECG: CPT

## 2018-01-31 PROCEDURE — 94760 N-INVAS EAR/PLS OXIMETRY 1: CPT

## 2018-01-31 PROCEDURE — 83735 ASSAY OF MAGNESIUM: CPT

## 2018-01-31 PROCEDURE — 96367 TX/PROPH/DG ADDL SEQ IV INF: CPT

## 2018-01-31 PROCEDURE — 71045 X-RAY EXAM CHEST 1 VIEW: CPT

## 2018-01-31 PROCEDURE — 94644 CONT INHLJ TX 1ST HOUR: CPT

## 2018-01-31 RX ADMIN — INSULIN HUMAN PRN UNIT: 100 INJECTION, SOLUTION PARENTERAL at 22:01

## 2018-01-31 NOTE — RAD
PORTABLE CHEST:

1/31/18

 

COMPARISON:  

1/5/18 study.

 

HISTORY: 

Shortness of breath. 

 

FINDINGS:  

Heart size is within normal limits. There is some atherosclerotic changes of the aorta. The lungs sarah
w some chronic change without focal infiltrates. Some questionable slight blunting to the left costop
hrenic angle. This could be technique related. 

 

IMPRESSION:  

Slight blunting to the left costophrenic angle. This could indicate a small effusion. PA and lateral 
chest film would be suggested if indicated. This could just be related to technique and overlap of ri
bs and soft tissues. 

 

 

POS: [facility]

## 2018-01-31 NOTE — HP
DATE OF ADMISSION:  01/31/2018

 

PRIMARY CARE PHYSICIAN:  Dr. Stephan Garcia.

 

PRIMARY CARDIOLOGIST:  The patient has seen Dr. Elliott in the past.

 

CHIEF COMPLAINT:  Shortness of breath.

 

HISTORY OF PRESENT ILLNESS:  The patient is a 62-year-old male with morbid obesity; COPD; chronic treasure
stolic heart failure; obstructive sleep apnea, on CPAP; chronic respiratory failure, on home O2; dequan
nary artery disease, status post stent; and diabetes mellitus, type 2; presented to the emergency [facility] with above complaints.  The patient was discharged from this facility approximately 3 weeks ago wit
h a diagnosis of COPD exacerbation.

 

Over the last one week, the patient has gradual worsening shortness of breath along with wheezing and
 productive cough.  He has gained around 11 pounds in the last 10 days or so.  He states that he is c
ompliant with fluid restriction and his medications.  He was short of breath on minimal exertion.  He
 denies any fevers, chills, or sick contacts.  No chest pain, palpitations, diaphoresis, nausea or vo
miting reported.

 

In the emergency room, his initial vital signs showed temperature 98.6, respiration of 22, pulse rate
 of 108 with a blood pressure of 199/106 with O2 saturation of 100% on nonrebreather.  His chest x-ra
y was negative for infiltrate.  It showed small bilateral pleural effusion.  His EKG showed sinus tac
hycardia with left axis deviation and PVCs.  He received IV fluids, aspirin, ceftriaxone, Decadron, D
uoNebs, and nebulizer treatment along with nitro patch in the emergency room.

 

PAST MEDICAL HISTORY:

1.  Chronic diastolic heart failure.

2.  Obstructive sleep apnea, on CPAP.

3.  Chronic respiratory failure, on home oxygen.

4.  Morbid obesity.

5.  Diabetes mellitus, type 2.

6.  Hypertension.

7.  Dyslipidemia.

8.  Coronary artery disease, status post myocardial infarction and stent placement.

9.  Pulmonary hypertension.

10.  Chronic obstructive pulmonary disease.

11.  Peripheral neuropathy.

12.  Chronic low back pain.

 

PAST SURGICAL HISTORY:

1.  Cardiac catheterization.

2.  Sinus surgery.

3.  Coronary stent placement in 2016.

 

ALLERGIES:  The patient is allergic to FLOMAX that causes nausea and LEVAQUIN that causes cardiac pro
blems.

 

SOCIAL HISTORY:  The patient is a former smoker, quit in 1997.  He is disabled, lives at home, FULL C
ODE, makes his own decisions with the help of his family.

 

FAMILY HISTORY:  Positive for father with heart disease and mother with stroke.

 

REVIEW OF SYSTEMS:  The following complete review of systems was negative, unless otherwise mentioned
 in the HPI or below:

Constitutional:  Weight loss or gain, ability to conduct usual activities.

Skin:  Rash, itching.

Eyes:  Double vision, pain.

ENT/Mouth:  Nose bleeding, neck stiffness, pain, tenderness.

Cardiovascular:  Palpitations, dyspnea on exertion, orthopnea.

Respiratory:  Shortness of breath, wheezing, cough, hemoptysis, fever or night sweats.

Gastrointestinal:  Poor appetite, abdominal pain, heartburn, nausea, vomiting, constipation, or diarr
hea.

Genitourinary:  Urgency, frequency, dysuria, nocturia.

Musculoskeletal:  Pain, swelling.

Neurologic/Psychiatric:  Anxiety, depression.

Allergy/Immunologic:  Skin rash, bleeding tendency.

 

HOME MEDICATIONS:  The patient is unable to recall any of his home medications.  Family to bring accu
rate list of medications later today.

 

PHYSICAL EXAMINATION:

VITAL SIGNS:  As discussed above.

GENERAL:  A 62-year-old male, morbidly obese, in mild respiratory distress, able to complete short ph
rases.

HEENT:  Head atraumatic, normocephalic.  Sclerae are anicteric.  Moist mucous membrane, no oral lesio
n.

NECK:  Supple.  JVD cannot be appreciated due to body habitus.

LUNGS:  Showed scattered wheezing with bibasilar rales.

HEART:  S1, S2 present.  Regular rate and rhythm, tachycardic, no heaves or pulsation.

ABDOMEN:  Obese, soft.  Bowel sounds present.

EXTREMITIES:  4+ edema in bilateral lower extremities with some calf tenderness.

SKIN:  Warm and dry.

LYMPH NODES:  No palpable lymph nodes in the neck.

PERIPHERAL VASCULAR:  Radial pulses palpable bilaterally.

MUSCULOSKELETAL:  No joint swelling or tenderness.

NEUROLOGIC:  Grossly nonfocal, moves all four extremities.

PSYCHIATRY:  Alert, awake, oriented x3.

 

LABORATORY AND X-RAY FINDINGS:

1.  CBC showed WBC 18.6 with a platelet count of 195, hemoglobin 14.6.

2.  PT, INR, PTT are in normal range.

3.  Blood gases showed pH 7.43 with pCO2 of 37.6, pO2 85.3.

4.  Chemistries showed sodium 135, potassium 3.9, chloride 99, bicarbonate 23, BUN 19, creatinine 1.7
5, glucose 371.  Lactic acid 3.2.

5.  Troponin of 0.036.

6.  CK was 255, lipase was normal.

7.  Influenza testing was negative.

8.  Chest x-ray and EKG by my review as discussed above.

 

IMPRESSION:

1.  Acute on chronic diastolic heart failure.

2.  Chronic obstructive pulmonary disease exacerbation.

3.  Sepsis secondary to suspected pneumonia, questionable pneumococcal.

4.  Hypertension.

5.  Obstructive sleep apnea, on CPAP.

6.  Morbid obesity.

7.  Diabetes mellitus, type 2.

8.  Chronic respiratory failure, on home oxygen.

9.  Coronary artery disease, status post stent placement.

10.  Diabetic neuropathy.

11.  Chronic pain syndrome.

12.  Hyponatremia.

13.  Chronic kidney disease, stage 3.

14.  Elevated troponins, probably secondary to congestive heart failure.

 

PLAN:  The patient will be monitored in the telemetry unit.  The patient has elevated lactic acid.  H
e is intravascularly depleted and has third spacing.  He will probably benefit from gentle diuresis w
ith Lasix drip.  We will also continue nebulizer treatment with low dose steroids.  Cardiology will b
e consulted.  His last echocardiogram was in August of last year.  His last cardiac catheterization w
as in 08/2016.  He had LAD stent placement at that time.  We will get serial cardiac enzymes.  Repeat
 lactic acid in a.m.  Due to bilateral lower extremity swelling and pain, we will rule out deep venou
s thrombosis.  We will continue aspirin and Plavix.  Insulin sliding scale.  Resume home CPAP.  Monit
or renal function closely.  Resume his home pain regimen once confirmed.

 

Plan of care was discussed with the patient in detail.  He stated understanding.

## 2018-02-01 LAB
ALBUMIN SERPL BCG-MCNC: 3.7 G/DL (ref 3.4–4.8)
ANION GAP SERPL CALC-SCNC: 16 MMOL/L (ref 10–20)
ANION GAP SERPL CALC-SCNC: 16 MMOL/L (ref 10–20)
BASOPHILS # BLD AUTO: 0 THOU/UL (ref 0–0.2)
BASOPHILS NFR BLD AUTO: 0 % (ref 0–1)
BUN SERPL-MCNC: 21 MG/DL (ref 8.4–25.7)
BUN SERPL-MCNC: 24 MG/DL (ref 8.4–25.7)
BUN/CREAT SERPL: 11.8
CALCIUM SERPL-MCNC: 8.9 MG/DL (ref 7.8–10.44)
CALCIUM SERPL-MCNC: 9 MG/DL (ref 7.8–10.44)
CHLORIDE SERPL-SCNC: 100 MMOL/L (ref 98–107)
CHLORIDE SERPL-SCNC: 102 MMOL/L (ref 98–107)
CO2 SERPL-SCNC: 22 MMOL/L (ref 23–31)
CO2 SERPL-SCNC: 27 MMOL/L (ref 23–31)
CREAT CL PREDICTED SERPL C-G-VRATE: 92 ML/MIN (ref 70–130)
CREAT CL PREDICTED SERPL C-G-VRATE: 97 ML/MIN (ref 70–130)
EOSINOPHIL # BLD AUTO: 0 THOU/UL (ref 0–0.7)
EOSINOPHIL NFR BLD AUTO: 0.2 % (ref 0–10)
GLUCOSE SERPL-MCNC: 314 MG/DL (ref 80–115)
GLUCOSE SERPL-MCNC: 422 MG/DL (ref 80–115)
HGB BLD-MCNC: 13.7 G/DL (ref 14–18)
LYMPHOCYTES # BLD: 0.7 THOU/UL (ref 1.2–3.4)
LYMPHOCYTES NFR BLD AUTO: 4.4 % (ref 21–51)
MAGNESIUM SERPL-MCNC: 2.2 MG/DL (ref 1.6–2.6)
MCH RBC QN AUTO: 27 PG (ref 27–31)
MCV RBC AUTO: 84.4 FL (ref 80–94)
MONOCYTES # BLD AUTO: 0.3 THOU/UL (ref 0.11–0.59)
MONOCYTES NFR BLD AUTO: 1.9 % (ref 0–10)
NEUTROPHILS # BLD AUTO: 15.4 THOU/UL (ref 1.4–6.5)
NEUTROPHILS NFR BLD AUTO: 93.5 % (ref 42–75)
PLATELET # BLD AUTO: 174 THOU/UL (ref 130–400)
POTASSIUM SERPL-SCNC: 3.5 MMOL/L (ref 3.5–5.1)
POTASSIUM SERPL-SCNC: 4 MMOL/L (ref 3.5–5.1)
RBC # BLD AUTO: 5.06 MILL/UL (ref 4.7–6.1)
SODIUM SERPL-SCNC: 136 MMOL/L (ref 136–145)
SODIUM SERPL-SCNC: 139 MMOL/L (ref 136–145)
TROPONIN I SERPL DL<=0.01 NG/ML-MCNC: 0.03 NG/ML (ref ?–0.03)
WBC # BLD AUTO: 16.5 THOU/UL (ref 4.8–10.8)

## 2018-02-01 RX ADMIN — Medication SCH ML: at 20:42

## 2018-02-01 RX ADMIN — HYDROCODONE BITARTRATE AND ACETAMINOPHEN PRN TAB: 5; 325 TABLET ORAL at 18:01

## 2018-02-01 RX ADMIN — ASPIRIN SCH MG: 81 TABLET ORAL at 08:09

## 2018-02-01 RX ADMIN — INSULIN HUMAN PRN UNIT: 100 INJECTION, SOLUTION PARENTERAL at 02:46

## 2018-02-01 RX ADMIN — INSULIN HUMAN PRN UNIT: 100 INJECTION, SOLUTION PARENTERAL at 20:43

## 2018-02-01 RX ADMIN — INSULIN HUMAN PRN UNIT: 100 INJECTION, SOLUTION PARENTERAL at 18:04

## 2018-02-01 RX ADMIN — INSULIN HUMAN PRN UNIT: 100 INJECTION, SOLUTION PARENTERAL at 06:33

## 2018-02-01 RX ADMIN — Medication SCH ML: at 08:10

## 2018-02-01 RX ADMIN — INSULIN HUMAN PRN UNIT: 100 INJECTION, SOLUTION PARENTERAL at 12:41

## 2018-02-01 NOTE — PDOC.PN
- Subjective


Encounter Start Date: 02/01/18


Encounter Start Time: 11:00





Patient seen and examined. SOB improving. Dry cough. No fever. No overnight 

events





- Objective


Resuscitation Status: 


 











Resuscitation Status           FULL:Full Resuscitation














MAR Reviewed: Yes


Vital Signs & Weight: 


 Vital Signs (12 hours)











  Temp Pulse Resp BP Pulse Ox


 


 02/01/18 20:00  98.9 F  102 H  18  139/78  95


 


 02/01/18 18:51      99


 


 02/01/18 18:49   99  16   95


 


 02/01/18 16:00  98.2 F  95  20  167/103 H  94 L


 


 02/01/18 14:16   88  16   96


 


 02/01/18 12:00  97.5 F L  89  18  165/90 H  98


 


 02/01/18 10:35   91  18   91 L








 Weight











Weight                         332 lb 4.8 oz














I&O: 


 











 01/31/18 02/01/18 02/02/18





 06:59 06:59 06:59


 


Intake Total  694.5 1030


 


Output Total  2425 2700


 


Balance  -1730.5 -1670











Result Diagrams: 


 02/01/18 04:44





 02/01/18 17:05


Additional Labs: 


 Accuchecks











  02/01/18 02/01/18 02/01/18





  20:03 18:05 11:32


 


POC Glucose  236 H  282 H  307 H














  02/01/18 02/01/18 01/31/18





  05:39 02:12 21:15


 


POC Glucose  389 H  373 H  301 H











EKG Reviewed by me: Yes (Tele SR)





Phys Exam





- Physical Examination


Constitutional: NAD


Respiratory: no wheezing, no rhonchi


Scat rales at bases, No accessory muscle use, Trachea midline


Cardiovascular: RRR, no rub


no heaves/pulsations


Gastrointestinal: soft, non-tender, no distention, positive bowel sounds


Musculoskeletal: edema present (improving)


Neurological: moves all 4 limbs


Psychiatric: A&O x 3





Dx/Plan





- Plan


continue antibiotics, out of bed/ambulate, DVT proph w/lovenox, DVT proph w/SCDs





IMPRESSION:


1.  Acute on chronic diastolic heart failure.


2.  Chronic obstructive pulmonary disease exacerbation.


3.  Sepsis secondary to suspected pneumonia, questionable pneumococcal.


4.  Hypertension.


5.  Obstructive sleep apnea, on CPAP.


6.  Morbid obesity BMI 52


7.  Diabetes mellitus, type 2.


8.  Chronic respiratory failure, on home oxygen.


9.  Coronary artery disease, status post stent placement.


10.  Diabetic neuropathy.


11.  Chronic pain syndrome.


12.  Hyponatremia.


13.  Chronic kidney disease, stage 3.


14.  Elevated troponins, probably secondary to congestive heart failure.


 


PLAN:  


* Change IV Lasix drip to 60 mg BID in AM


* Cardio following


* AM labs


* Add Potassium


* Cont to monitor


* Cardiac Rehab


* Cont fluid rest








Review of Systems





- Review of Systems


Cardiovascular: edema.  negative: chest pain, palpitations, orthopnea, 

paroxysmal nocturnal dyspnea, light headedness, other


Gastrointestinal: negative: Nausea, Vomiting, Abdominal Pain, Diarrhea, 

Constipation, Melena, Hematochezia





- Medications/Allergies


Allergies/Adverse Reactions: 


 Allergies











Allergy/AdvReac Type Severity Reaction Status Date / Time


 


levofloxacin [From Levaquin] Allergy   Verified 01/31/18 23:55


 


tamsulosin [From Flomax] Allergy   Verified 01/31/18 23:55











Medications: 


 Current Medications





Acetaminophen (Tylenol)  650 mg PO Q4H PRN


   PRN Reason: Headache/Fever or Pain


Hydrocodone Bitart/Acetaminophen (Norco 5/325)  1 tab PO BIDPRN PRN


   PRN Reason: Pain


   Last Admin: 02/01/18 18:01 Dose:  1 tab


Albuterol/Ipratropium (Duoneb)  3 ml NEB [id number] [facility]


   Last Admin: 02/01/18 18:49 Dose:  3 ml


Albuterol/Ipratropium (Duoneb)  3 ml NEB [id number] PRN


   PRN Reason: SOB &/or Wheezing


Aspirin (Ecotrin)  81 mg PO DAILY [facility]


   Last Admin: 02/01/18 08:09 Dose:  81 mg


Bisacodyl (Dulcolax)  10 mg ID Q24H PRN


   PRN Reason: Constipation


Calcium Carbonate (Tums)  1,000 mg PO Q4H PRN


   PRN Reason: Heartburn  or Indigestion


Cefdinir (Omnicef)  300 mg PO DAILY [facility]


   Last Admin: 02/01/18 08:10 Dose:  300 mg


Clopidogrel Bisulfate (Plavix)  75 mg PO QAM [facility]


   Last Admin: 02/01/18 08:10 Dose:  75 mg


Dextrose/Water (Dextrose 50%)  25 gm SLOW IVP PRN PRN


   PRN Reason: Hypoglycemia


Docusate Sodium (Colace)  100 mg PO BID [facility]


   Last Admin: 02/01/18 20:41 Dose:  100 mg


Enoxaparin Sodium (Lovenox)  40 mg SC 0900 [facility]


   Last Admin: 02/01/18 08:10 Dose:  40 mg


Famotidine (Pepcid)  20 mg PO QPM [facility]


   Last Admin: 02/01/18 20:41 Dose:  20 mg


Furosemide (Lasix)  60 mg SLOW IVP 0600,1400 [facility]


Glucagon (Glucagon)  1 mg IM PRN PRN


   PRN Reason: Hypoglycemia


Dextrose/Water (D5w)  1,000 mls @ 0 mls/hr IV .Q0M PRN; As Directed


   PRN Reason: Hypoglycemia


Insulin Detemir 15 units/ (Miscellaneous Medication)  0.15 mls @ 0 mls/hr SC HS 

[facility]


   Last Admin: 02/01/18 20:41 Dose:  0.15 mls


Insulin Detemir 25 units/ (Miscellaneous Medication)  0.25 mls @ 0 mls/hr SC 

QAM [facility]


   Last Admin: 02/01/18 10:08 Dose:  0.25 mls


Insulin Human Regular (Humulin R)  0 units SC .BEDTIME SLIDING SC PRN


   PRN Reason: Bedtime Correctional Scale


   Last Admin: 02/01/18 20:43 Dose:  2 unit


Insulin Human Regular (Humulin R)  0 units SC .AGGRESSIVE SLIDING  PRN


   PRN Reason: Aggressive Sliding Scale


   Last Admin: 02/01/18 18:04 Dose:  9 unit


Nitroglycerin (Nitrostat)  0.4 mg PO Q5MIN PRN


   PRN Reason: Chest Pain


Ondansetron HCl (Zofran Odt)  4 mg PO Q6H PRN


   PRN Reason: Nausea/Vomiting


Ondansetron HCl (Zofran)  4 mg IVP Q6H PRN


   PRN Reason: Nausea/Vomiting


Potassium Chloride (K-Dur)  20 meq PO TID-WM [facility]


Potassium Chloride (K-Dur)  20 meq PO ONE [facility]


Prednisone (Prednisone)  10 mg PO QAM-[facility]


Senna (Senokot)  2 tab PO HSPRN PRN


   PRN Reason: Constipation


Sodium Chloride (Flush - Normal Saline)  10 ml IVF Q12HR [facility]


   Last Admin: 02/01/18 20:42 Dose:  10 ml


Sodium Chloride (Flush - Normal Saline)  10 ml IVF PRN PRN


   PRN Reason: Saline Flush

## 2018-02-01 NOTE — CON
DATE OF CONSULTATION:  02/01/2018

 

REASON FOR CONSULTATION:  Shortness of breath.

 

HISTORY OF PRESENT ILLNESS:  Mr. Moya is a pleasant 62-year-old white gentleman who comes to the [facility] for increased shortness of breath.  He has a history of COPD and he is on chronic home O2 at 2 
liters.  He had been followed by Dr. Elliott in the past for diastolic heart failure.  He also has coron
stefani artery disease status post stenting to his LAD.  He has got 2 stents there, placed in 2015.  He c
omes in for the last 1-2 weeks he has been noticing increased shortness of breath, slowly getting wor
se.  He also noticed increased leg swelling as well as abdominal distention, now slowly getting worse
.  He got to the point where he could not lay down on his back and he felt really short of breath wit
h doing any activity.  He decided to come in for evaluation.  Here, he was admitted and started on IV
 Lasix.  He is actually on Lasix drip right now at 5 mg per hour and has diuresed some and he already
 feels much better.  He was discharged about 3 weeks ago from this facility for COPD exacerbation.

 

PAST MEDICAL HISTORY:

1.  Chronic diastolic heart failure.

2.  Obstructive sleep apnea, on CPAP at home.

3.  Chronic respiratory insufficiency, on home O2.

4.  Chronic obstructive pulmonary disease.

5.  Morbid obesity.

6.  Type 2 diabetes.

7.  Hypertension.

8.  Hyperlipidemia.

9.  Coronary artery disease, status post stenting to the LAD.

10.  Pulmonary hypertension.

11.  Peripheral neuropathy.

12.  Chronic low back pain.

 

PAST SURGICAL HISTORY:

1.  Cardiac catheterization with stenting as above.

2.  Sinus surgery.

 

ALLERGIES:  FLOMAX, causes nausea; LEVAQUIN, causes heart issues in the past.

 

OUTPATIENT MEDICATIONS:  Include,

1.  Albuterol inhaler.

2.  DuoNeb.

3.  Advair.

4.  Tessalon Perles.

5.  Trazodone 100 mg at bedtime.

6.  Tramadol p.r.n.

7.  Levemir 42 units subcutaneously at bedtime.

8.  Insulin Humalog KwikPen 14 units subcu before each meal.

9.  Ferrous sulfate.

10.  Singulair.

11.  Hydralazine 25 mg p.o. b.i.d.

12.  Glipizide 10 mg b.i.d.

13.  Gabapentin 300 mg t.i.d.

14.  Lasix 40 mg p.o. b.i.d.

15.  Prozac 50 mg daily.

16.  Potassium chloride 40 mEq t.i.d.

17.  Protonix 40 mg a day.

18.  Klonopin 1 mg at bedtime.

19.  BuSpar 5 mg b.i.d.

20.  Plavix 75 mg a day.

21.  Benzonatate.

22.  Atorvastatin 20 mg at bedtime.

23.  Aspirin 81 daily.

24.  Amlodipine 10 mg daily.

25.  Theophylline 1 capsule b.i.d.

26.  Aldactone 12.5 mg p.o. b.i.d.

27.  Prednisone 20 mg q.a.m.

 

SOCIAL HISTORY:  Former smoker, quit in 1997.  No alcohol, tobacco, or drugs currently.

 

FAMILY HISTORY:  Positive for heart disease in father, mother with a stroke, otherwise noncontributor
y.

 

REVIEW OF SYSTEMS:   A 12-point review of systems was done and is all negative unless stated in the h
istory of present illness.

 

PHYSICAL EXAMINATION:

VITAL SIGNS:  Temperature 98.2, pulse 91, respiratory rate 18, satting 91% on 2 liters, blood pressur
e 158/86.

GENERAL:  Awake, alert, oriented x3, in no distress.

HEENT:  Normocephalic and atraumatic.

NECK:  Supple, JVD cannot be assessed due to patient's body habitus.

LUNGS:  Have reduced breath sounds bilaterally.

CARDIOVASCULAR:  Distant heart sounds, but S1, S2, no S3 or S4, regular.

ABDOMEN:  Distended but nontender.  He states it is better than yesterday.

EXTREMITIES:  2+ edema.

SKIN:  Warm and dry.

 

LABORATORY WORK:  Reviewed.  White count of 18,000 on arrival, down to 16; hemoglobin 14; hematocrit 
44; platelet count 195.  Coags were normal.  ABG was unremarkable.  Chemistry was reviewed.  Creatini
ne 1.7, GFR 39, glucose in the 300.  Lactic acid was 2.6, troponin has been indeterminate range 0.03,
 0.03, 0.03 and normal CK-MB.  BNP was 26, lipase of 27.  UA with 2+ bacteria and 500 glucose, 30 pro
tein, no nitrites.

 

EKG is reviewed.

 

Chest x-ray was reviewed and shows slight blunting of the left costophrenic angle, which could indica
te a small effusion, atherosclerotic changes of the aorta and chronic lung changes without focal infi
ltrates.

 

Lower extremity venous ultrasound shows no evidence of DVT.

 

ASSESSMENT AND PLAN:

1.  Acute on chronic diastolic heart failure.

2.  Right ventricular dysfunction.

3.  Pulmonary hypertension.

4.  Chronic obstructive pulmonary disease.

5.  Severe sleep apnea.

6.  Coronary artery disease, stable.  No acute coronary syndrome at this time.

 

PLAN:  Agreed with diuresis.  I think he has accumulated fluid both in his abdomen and his lower extr
emities.  He is already feeling much better after diuresis and his creatinine has remained stable.  W
e will switch him to Lasix just 2 doses a day instead of his drip, but we will do this tomorrow.  We 
will stop the drip sometime later this evening.  We will put him on 60 IV b.i.d. starting tomorrow mo
rning.  Otherwise, he is most likely intravascularly depleted with most of the fluid stuck on the rig
ht side of his heart and hopefully will be able to get it moved on to the other side and diurese.

 

Thank you for letting us to participate in the care of your patient.  We will follow.

## 2018-02-01 NOTE — ULT
BILATERAL LOWER EXTREMITY VENOUS DOPPLER ULTRASOUND:

 

Date:  02/01/18 

 

HISTORY:  

Bilateral lower extremity edema, pain, and redness. 

 

TECHNIQUE:  

Gray scale ultrasound with color flow and spectral Doppler imaging of the deep venous systems of the 
lower extremities was performed bilaterally. 

 

FINDINGS:

There is good flow, compression, and augmentation noted in the common femoral, femoral, deep femoral,
 popliteal, posterior tibial, and greater saphenous veins on either side. 

 

IMPRESSION: 

No evidence of deep venous thrombosis in either lower extremity.  

 

POS: [facility]

## 2018-02-02 LAB
ALBUMIN SERPL BCG-MCNC: 3.6 G/DL (ref 3.4–4.8)
ANION GAP SERPL CALC-SCNC: 16 MMOL/L (ref 10–20)
BUN SERPL-MCNC: 23 MG/DL (ref 8.4–25.7)
BUN/CREAT SERPL: 15.75
CALCIUM SERPL-MCNC: 8.5 MG/DL (ref 7.8–10.44)
CHLORIDE SERPL-SCNC: 105 MMOL/L (ref 98–107)
CO2 SERPL-SCNC: 22 MMOL/L (ref 23–31)
CREAT CL PREDICTED SERPL C-G-VRATE: 112 ML/MIN (ref 70–130)
GLUCOSE SERPL-MCNC: 143 MG/DL (ref 80–115)
MAGNESIUM SERPL-MCNC: 2.3 MG/DL (ref 1.6–2.6)
POTASSIUM SERPL-SCNC: 3.4 MMOL/L (ref 3.5–5.1)
SODIUM SERPL-SCNC: 140 MMOL/L (ref 136–145)

## 2018-02-02 RX ADMIN — Medication SCH TAB: at 16:37

## 2018-02-02 RX ADMIN — HYDROCODONE BITARTRATE AND ACETAMINOPHEN PRN TAB: 5; 325 TABLET ORAL at 05:19

## 2018-02-02 RX ADMIN — Medication SCH ML: at 22:38

## 2018-02-02 RX ADMIN — ASPIRIN SCH MG: 81 TABLET ORAL at 08:49

## 2018-02-02 RX ADMIN — NYSTATIN SCH APPLIC: 100000 POWDER TOPICAL at 22:45

## 2018-02-02 RX ADMIN — INSULIN HUMAN PRN UNIT: 100 INJECTION, SOLUTION PARENTERAL at 16:37

## 2018-02-02 RX ADMIN — Medication SCH ML: at 08:50

## 2018-02-02 RX ADMIN — HYDROCODONE BITARTRATE AND ACETAMINOPHEN PRN TAB: 5; 325 TABLET ORAL at 00:49

## 2018-02-02 NOTE — RAD
KUB:

 

DATE: 2/2/18.

 

PROVIDED CLINICAL HISTORY: 

Abdominal pain.

 

FINDINGS: 

There is nonspecific conspicuous gaseous distention of transverse colon.  The abdominal bowel gas pat
tern is otherwise nonspecific.  The visualized lung bases appear clear.  The supine nature of this st
udy limits sensitivity for detection of pneumoperitoneum.  No suspicious calcifications are evident.

 

IMPRESSION: 

Conspicuous gaseous distention of colon.  If there is concern for obstruction, consider CT.

 

POS: [facility]

## 2018-02-02 NOTE — PDOC.CTH
Cardiology Progress Note





- Subjective





He is doing much better today. He has diuresed well. His breathing is much 

better and his abdomen feels much less bloated. 





- Objective


 Vital Signs











  Temp Pulse Resp BP Pulse Ox


 


 02/02/18 08:00  97.4 F L  92  19  169/99 H  95


 


 02/02/18 06:15  97.8 F  78  18  175/89 H  98


 


 02/02/18 02:37      99


 


 02/02/18 02:36      94 L


 


 02/02/18 00:00    20  


 


 02/01/18 23:24      95








 











Weight                         330 lb 1.6 oz














 











 02/01/18 02/02/18 02/03/18





 06:59 06:59 06:59


 


Intake Total 694.5 1546 


 


Output Total 2425 3600 


 


Balance -1730.5 -2054 














- Physical Examination


General/Neuro: alert & oriented x3, NAD


Neck: no JVD present


Lungs: CTA, unlabored respirations


Heart: RRR


Abdomen: NT/ND


Extremities: + edema B (1+ improved.)





- Telemetry


Telemetry Rhythm: NSR





- Labs


Result Diagrams: 


 02/01/18 04:44





 02/02/18 04:52


 Troponin/CKMB











CK-MB (CK-2)  1.6 ng/mL (0-6.6)   01/31/18  16:56    


 


Troponin I  0.033 ng/mL (< 0.028)  H  01/31/18  23:27    














- Assessment/Plan





1. Acute on chronic diastolic heart failure


2. RV failure


3. COPD


4. Morbid obesity


5. Severe sleep apnea








PLAN:


- Continue IV diuresis. 


- Replace K


- Creatinine continues to improve with diuresis. 


- Echocardiogram pending.

## 2018-02-02 NOTE — PDOC.PN
- Subjective


Encounter Start Date: 02/02/18


Encounter Start Time: 14:30





Patient seen and examined. Abd discomfort +, No N/V. No overnight events





- Objective


Resuscitation Status: 


 











Resuscitation Status           FULL:Full Resuscitation














MAR Reviewed: Yes


Vital Signs & Weight: 


 Vital Signs (12 hours)











  Temp Pulse Resp BP Pulse Ox


 


 02/02/18 20:20  98.5 F  93  20  165/95 H  95


 


 02/02/18 18:46   85  16   95


 


 02/02/18 16:00  98.2 F  93  19  160/92 H  95


 


 02/02/18 15:33   93  16  


 


 02/02/18 12:00  98.4 F  90  20  160/89 H  96


 


 02/02/18 11:34   93  16  








 Weight











Weight                         330 lb 1.6 oz














I&O: 


 











 02/01/18 02/02/18 02/03/18





 06:59 06:59 06:59


 


Intake Total 694.5 1546 860


 


Output Total 2425 3600 3100


 


Balance -1730.5 -1159 -1490











Result Diagrams: 


 02/01/18 04:44





 02/02/18 04:52


Additional Labs: 


 Accuchecks











  02/02/18 02/02/18 02/02/18





  20:08 15:59 06:07


 


POC Glucose  219 H  183 H  156 H














  02/02/18 02/01/18





  02:27 20:03


 


POC Glucose  175 H  236 H











Radiology Reviewed by me: Yes (KUB - ?ileus)


EKG Reviewed by me: Yes (Tele SR)





Phys Exam





- Physical Examination


Constitutional: NAD


Respiratory: no wheezing, no rhonchi


Symmetrical, no accessory muscle use


Cardiovascular: RRR, no rub


no heaves or pulsations


Gastrointestinal: soft, positive bowel sounds


mild gen tenderness, no rebound/guarding


Musculoskeletal: edema present


Neurological: moves all 4 limbs


Psychiatric: A&O x 3





Dx/Plan





- Plan


evans catheter, out of bed/ambulate, DVT proph w/lovenox, DVT proph w/SCDs





IMPRESSION:


1.  Acute on chronic diastolic heart failure. improving


2.  Chronic obstructive pulmonary disease exacerbation.


3.  Sepsis secondary to suspected pneumonia, questionable pneumococcal.


4.  Hypertension.


5.  Obstructive sleep apnea, on CPAP.


6.  New onset Abd pain ? Ileus


7.  Diabetes mellitus, type 2.


8.  Chronic respiratory failure, on home oxygen.


9.  Coronary artery disease, status post stent placement.


10.  Diabetic neuropathy.


11.  Chronic pain syndrome.


12.  Hyponatremia.


13.  Chronic kidney disease, stage 3.


14.  Elevated troponins, probably secondary to congestive heart failure./ 

Morbid obesity BMI 52 /hypokalemia


 


PLAN:  


* KUB done


* Consult GI


* DC Hydrocodone


* Repeat KUB in AM


* Cont IV Lasix 60 mg BID


* Cardio following


* AM labs


* Replace Potassium


* Cont to monitor


* Cardiac Rehab


* Cont fluid rest


* Confirm home meds - family to bring accurate list - Pt unsure if he takes 

Theophylline and other meds








Review of Systems





- Review of Systems


Respiratory: SOB with Excertion.  negative: Cough, Dry, Shortness of Breath, 

Hemoptysis, Pleuritic Pain, Sputum, Wheezing


Cardiovascular: edema.  negative: chest pain, palpitations, orthopnea, 

paroxysmal nocturnal dyspnea, light headedness





- Medications/Allergies


Allergies/Adverse Reactions: 


 Allergies











Allergy/AdvReac Type Severity Reaction Status Date / Time


 


levofloxacin [From Levaquin] Allergy   Verified 01/31/18 23:55


 


tamsulosin [From Flomax] Allergy   Verified 01/31/18 23:55











Medications: 


 Current Medications





Acetaminophen (Tylenol)  650 mg PO Q4H PRN


   PRN Reason: Headache/Fever or Pain


Albuterol/Ipratropium (Duoneb)  3 ml NEB [id number] [facility]


   Last Admin: 02/02/18 18:46 Dose:  3 ml


Albuterol/Ipratropium (Duoneb)  3 ml NEB [id number] PRN


   PRN Reason: SOB &/or Wheezing


Aspirin (Ecotrin)  81 mg PO DAILY [facility]


   Last Admin: 02/02/18 08:49 Dose:  81 mg


Atorvastatin Calcium (Lipitor)  20 mg PO [facility]


Bisacodyl (Dulcolax)  10 mg RI Q24H PRN


   PRN Reason: Constipation


Calcium Carbonate (Tums)  1,000 mg PO Q4H PRN


   PRN Reason: Heartburn  or Indigestion


   Last Admin: 02/02/18 05:24 Dose:  1,000 mg


Calcium/Vitamin D (Caltrate 600 + Vit D)  1 tab PO BID-[facility]


   Last Admin: 02/02/18 16:37 Dose:  1 tab


Cefdinir (Omnicef)  300 mg PO DAILY [facility]


   Last Admin: 02/02/18 08:50 Dose:  300 mg


Clopidogrel Bisulfate (Plavix)  75 mg PO QAM [facility]


   Last Admin: 02/02/18 08:49 Dose:  75 mg


Dextrose/Water (Dextrose 50%)  25 gm SLOW IVP PRN PRN


   PRN Reason: Hypoglycemia


Docusate Sodium (Colace)  100 mg PO BID [facility]


   Last Admin: 02/02/18 08:50 Dose:  Not Given


Enoxaparin Sodium (Lovenox)  40 mg SC 0900 [facility]


   Last Admin: 02/02/18 08:50 Dose:  40 mg


Furosemide (Lasix)  60 mg SLOW IVP 0600,1400 [facility]


   Last Admin: 02/02/18 14:42 Dose:  60 mg


Glucagon (Glucagon)  1 mg IM PRN PRN


   PRN Reason: Hypoglycemia


Dextrose/Water (D5w)  1,000 mls @ 0 mls/hr IV .Q0M PRN; As Directed


   PRN Reason: Hypoglycemia


Insulin Detemir 15 units/ (Miscellaneous Medication)  0.15 mls @ 0 mls/hr SC [facility]


   Last Admin: 02/01/18 20:41 Dose:  0.15 mls


Insulin Detemir 25 units/ (Miscellaneous Medication)  0.25 mls @ 0 mls/hr SC 

[facility]


   Last Admin: 02/02/18 10:16 Dose:  0.25 mls


Insulin Human Regular (Humulin R)  0 units SC .BEDTIME SLIDING SC PRN


   PRN Reason: Bedtime Correctional Scale


   Last Admin: 02/01/18 20:43 Dose:  2 unit


Insulin Human Regular (Humulin R)  0 units SC .AGGRESSIVE SLIDING  PRN


   PRN Reason: Aggressive Sliding Scale


   Last Admin: 02/02/18 16:37 Dose:  3 unit


Nitroglycerin (Nitrostat)  0.4 mg PO Q5MIN PRN


   PRN Reason: Chest Pain


Nystatin (Mycostatin Powder)  0 gm TOP BID [facility]


Ondansetron HCl (Zofran Odt)  4 mg PO Q6H PRN


   PRN Reason: Nausea/Vomiting


Ondansetron HCl (Zofran)  4 mg IVP Q6H PRN


   PRN Reason: Nausea/Vomiting


   Last Admin: 02/02/18 10:14 Dose:  4 mg


Pantoprazole Sodium (Protonix)  40 mg PO DAILY [facility]


Potassium Chloride (K-Dur)  40 meq PO BID-[facility]


   Last Admin: 02/02/18 16:37 Dose:  40 meq


Prednisone (Prednisone)  20 mg PO QAM-[facility]


Senna (Senokot)  2 tab PO HSPRN PRN


   PRN Reason: Constipation


Sodium Chloride (Flush - Normal Saline)  10 ml IVF Q12HR [facility]


   Last Admin: 02/02/18 08:50 Dose:  10 ml


Sodium Chloride (Flush - Normal Saline)  10 ml IVF PRN PRN


   PRN Reason: Saline Flush


Tramadol HCl (Ultram)  50 mg PO Q4H PRN


   PRN Reason: Moderate Pain (4-6)


   Last Admin: 02/02/18 16:38 Dose:  50 mg

## 2018-02-03 LAB
ALBUMIN SERPL BCG-MCNC: 3.4 G/DL (ref 3.4–4.8)
ANION GAP SERPL CALC-SCNC: 14 MMOL/L (ref 10–20)
ANION GAP SERPL CALC-SCNC: 16 MMOL/L (ref 10–20)
BASOPHILS # BLD AUTO: 0 THOU/UL (ref 0–0.2)
BASOPHILS NFR BLD AUTO: 0.1 % (ref 0–1)
BUN SERPL-MCNC: 21 MG/DL (ref 8.4–25.7)
BUN SERPL-MCNC: 22 MG/DL (ref 8.4–25.7)
BUN/CREAT SERPL: 15.67
CALCIUM SERPL-MCNC: 8.6 MG/DL (ref 7.8–10.44)
CALCIUM SERPL-MCNC: 8.7 MG/DL (ref 7.8–10.44)
CHLORIDE SERPL-SCNC: 102 MMOL/L (ref 98–107)
CHLORIDE SERPL-SCNC: 105 MMOL/L (ref 98–107)
CO2 SERPL-SCNC: 22 MMOL/L (ref 23–31)
CO2 SERPL-SCNC: 29 MMOL/L (ref 23–31)
CREAT CL PREDICTED SERPL C-G-VRATE: 104 ML/MIN (ref 70–130)
CREAT CL PREDICTED SERPL C-G-VRATE: 119 ML/MIN (ref 70–130)
EOSINOPHIL # BLD AUTO: 0 THOU/UL (ref 0–0.7)
EOSINOPHIL NFR BLD AUTO: 0.5 % (ref 0–10)
GLUCOSE SERPL-MCNC: 153 MG/DL (ref 80–115)
GLUCOSE SERPL-MCNC: 169 MG/DL (ref 80–115)
HGB BLD-MCNC: 13.7 G/DL (ref 14–18)
LYMPHOCYTES # BLD: 1.6 THOU/UL (ref 1.2–3.4)
LYMPHOCYTES NFR BLD AUTO: 17.1 % (ref 21–51)
MAGNESIUM SERPL-MCNC: 2.3 MG/DL (ref 1.6–2.6)
MCH RBC QN AUTO: 26.9 PG (ref 27–31)
MCV RBC AUTO: 84.6 FL (ref 80–94)
MONOCYTES # BLD AUTO: 0.8 THOU/UL (ref 0.11–0.59)
MONOCYTES NFR BLD AUTO: 8.6 % (ref 0–10)
NEUTROPHILS # BLD AUTO: 6.9 THOU/UL (ref 1.4–6.5)
NEUTROPHILS NFR BLD AUTO: 73.7 % (ref 42–75)
PLATELET # BLD AUTO: 209 THOU/UL (ref 130–400)
POTASSIUM SERPL-SCNC: 3.4 MMOL/L (ref 3.5–5.1)
POTASSIUM SERPL-SCNC: 3.4 MMOL/L (ref 3.5–5.1)
RBC # BLD AUTO: 5.1 MILL/UL (ref 4.7–6.1)
SODIUM SERPL-SCNC: 140 MMOL/L (ref 136–145)
SODIUM SERPL-SCNC: 142 MMOL/L (ref 136–145)
WBC # BLD AUTO: 9.4 THOU/UL (ref 4.8–10.8)

## 2018-02-03 RX ADMIN — NYSTATIN SCH APPLIC: 100000 POWDER TOPICAL at 21:10

## 2018-02-03 RX ADMIN — Medication SCH ML: at 08:04

## 2018-02-03 RX ADMIN — Medication SCH ML: at 21:10

## 2018-02-03 RX ADMIN — ASPIRIN SCH MG: 81 TABLET ORAL at 08:03

## 2018-02-03 RX ADMIN — MOMETASONE FUROATE AND FORMOTEROL FUMARATE DIHYDRATE SCH PUFF: 200; 5 AEROSOL RESPIRATORY (INHALATION) at 18:49

## 2018-02-03 RX ADMIN — NYSTATIN SCH APPLIC: 100000 POWDER TOPICAL at 08:04

## 2018-02-03 RX ADMIN — Medication SCH TAB: at 16:51

## 2018-02-03 RX ADMIN — Medication SCH TAB: at 08:02

## 2018-02-03 NOTE — PDOC.PN
- Subjective


Encounter Start Date: 02/03/18


Encounter Start Time: 12:30





Patient seen and examined. No new complaints. No overnight events. SOB 

improving. No CP.





- Objective


Resuscitation Status: 


 











Resuscitation Status           FULL:Full Resuscitation














MAR Reviewed: Yes


Vital Signs & Weight: 


 Vital Signs (12 hours)











  Temp Pulse Resp BP Pulse Ox


 


 02/03/18 12:00     139/96 H 


 


 02/03/18 11:33  97.8 F  84  20   98


 


 02/03/18 11:29   86  16  


 


 02/03/18 08:00  97.9 F  84  19  


 


 02/03/18 07:58      95


 


 02/03/18 07:57   84  16  


 


 02/03/18 07:55  97.9 F  84  19  142/90 H  95


 


 02/03/18 05:08  98.3 F  85  20  165/94 H  93 L








 Weight











Weight                         325 lb 1.6 oz














I&O: 


 











 02/02/18 02/03/18 02/04/18





 06:59 06:59 06:59


 


Intake Total 1546 1360 


 


Output Total 3600 5350 


 


Balance -5901 -7266 











Result Diagrams: 


 02/03/18 04:59





 02/03/18 11:05


Additional Labs: 


 Accuchecks











  02/03/18 02/03/18 02/02/18





  11:39 05:59 20:08


 


POC Glucose  158 H  128 H  219 H














  02/02/18 02/02/18





  15:59 11:48


 


POC Glucose  183 H  158 H











Radiology Reviewed by me: Yes (KUB - no sig change)


EKG Reviewed by me: Yes (Tele SR)





Phys Exam





- Physical Examination


Constitutional: NAD


Respiratory: no wheezing, no rhonchi


Scat rales at bases


Cardiovascular: RRR, no rub


Gastrointestinal: soft, positive bowel sounds


Musculoskeletal: edema present


Neurological: moves all 4 limbs





Dx/Plan





- Plan


DVT proph w/lovenox, DVT proph w/SCDs





IMPRESSION:


1.  Acute on chronic diastolic heart failure. on IV Lasix


2.  Chronic obstructive pulmonary disease exacerbation. on Nebs/steroids/Atbx


3.  Sepsis secondary to suspected pneumonia, questionable pneumococcal.


4.  Hypertension.


5.  Obstructive sleep apnea, on CPAP.


6.  New onset Abd pain - due to mild Ileus


7.  Diabetes mellitus, type 2. on sliding scale with Levemir


8.  Chronic respiratory failure, on home oxygen.


9.  Coronary artery disease, status post stent placement.


10.  Diabetic neuropathy.


11.  Chronic pain syndrome.


12.  Hyponatremia.


13.  Chronic kidney disease, stage 3.


14.  Elevated troponins, probably secondary to congestive heart failure./ 

Morbid obesity BMI 52 /hypokalemia


 


PLAN:  


* Losartan added today per Cardiology


* Resume selected home mes


* Cont IV Lasix 60 mg BID


* Replace Potassium


* AM labs


* Cont to monitor


* Cont fluid rest


* BMP in AM


* Cont Miralax








Review of Systems





- Review of Systems


Respiratory: negative: Cough, Dry, Shortness of Breath, Hemoptysis, SOB with 

Excertion, Pleuritic Pain, Sputum, Wheezing


Cardiovascular: negative: chest pain, palpitations, orthopnea, paroxysmal 

nocturnal dyspnea, edema, light headedness





- Medications/Allergies


Allergies/Adverse Reactions: 


 Allergies











Allergy/AdvReac Type Severity Reaction Status Date / Time


 


levofloxacin [From Levaquin] Allergy   Verified 01/31/18 23:55


 


tamsulosin [From Flomax] Allergy   Verified 01/31/18 23:55











Medications: 


 Current Medications





Acetaminophen (Tylenol)  650 mg PO Q4H PRN


   PRN Reason: Headache/Fever or Pain


Albuterol/Ipratropium (Duoneb)  3 ml NEB [id number] [facility]


   Last Admin: 02/03/18 11:29 Dose:  3 ml


Albuterol/Ipratropium (Duoneb)  3 ml NEB [id number] PRN


   PRN Reason: SOB &/or Wheezing


Aspirin (Ecotrin)  81 mg PO DAILY [facility]


   Last Admin: 02/03/18 08:03 Dose:  81 mg


Atorvastatin Calcium (Lipitor)  20 mg PO HS [facility]


   Last Admin: 02/02/18 22:38 Dose:  20 mg


Benzonatate (Tessalon)  100 mg PO TID PRN


   PRN Reason: Cough


Bisacodyl (Dulcolax)  10 mg GA Q24H PRN


   PRN Reason: Constipation


Buspirone HCl (Buspar)  5 mg PO BID [facility]


Calcium Carbonate (Tums)  1,000 mg PO Q4H PRN


   PRN Reason: Heartburn  or Indigestion


   Last Admin: 02/02/18 05:24 Dose:  1,000 mg


Calcium/Vitamin D (Caltrate 600 + Vit D)  1 tab PO BID-[facility]


   Last Admin: 02/03/18 08:02 Dose:  1 tab


Cefdinir (Omnicef)  300 mg PO DAILY [facility]


   Last Admin: 02/03/18 08:02 Dose:  300 mg


Clonazepam (Klonopin)  2 mg PO HS [facility]


Clopidogrel Bisulfate (Plavix)  75 mg PO QAM [facility]


   Last Admin: 02/03/18 08:03 Dose:  75 mg


Dextrose/Water (Dextrose 50%)  25 gm SLOW IVP PRN PRN


   PRN Reason: Hypoglycemia


Docusate Sodium (Colace)  100 mg PO BID [facility]


   Last Admin: 02/03/18 08:03 Dose:  Not Given


Enoxaparin Sodium (Lovenox)  40 mg SC 0900 [facility]


   Last Admin: 02/03/18 08:03 Dose:  40 mg


Fluoxetine HCl (Prozac)  50 mg PO DAILY [facility]


Furosemide (Lasix)  60 mg SLOW IVP 0600,1400 [facility]


   Last Admin: 02/03/18 14:05 Dose:  60 mg


Gabapentin (Neurontin)  300 mg PO TID [facility]


   Last Admin: 02/03/18 14:05 Dose:  300 mg


Glipizide (Glucotrol)  5 mg PO BID-[facility]


Glucagon (Glucagon)  1 mg IM PRN PRN


   PRN Reason: Hypoglycemia


Dextrose/Water (D5w)  1,000 mls @ 0 mls/hr IV .Q0M PRN; As Directed


   PRN Reason: Hypoglycemia


Insulin Detemir 15 units/ (Miscellaneous Medication)  0.15 mls @ 0 mls/hr SC [facility]


   Last Admin: 02/02/18 22:39 Dose:  0.15 mls


Insulin Detemir 25 units/ (Miscellaneous Medication)  0.25 mls @ 0 mls/hr [facility]


   Last Admin: 02/03/18 10:40 Dose:  0.25 mls


Insulin Human Regular (Humulin R)  0 units SC .BEDTIME SLIDING SC PRN


   PRN Reason: Bedtime Correctional Scale


   Last Admin: 02/01/18 20:43 Dose:  2 unit


Insulin Human Regular (Humulin R)  0 units SC .AGGRESSIVE SLIDING  PRN


   PRN Reason: Aggressive Sliding Scale


   Last Admin: 02/02/18 16:37 Dose:  3 unit


Losartan Potassium (Cozaar)  25 mg PO BID [facility]


Mometasone Furoate/Formoterol Fumar (Dulera 200 Mcg/5 Mcg Inhaler)  2 puff INH 

BID-RT [facility]


Montelukast Sodium (Singulair)  10 mg PO  [facility]


Nebivolol (Bystolic)  2.5 mg PO DAILY [facility]


Nitroglycerin (Nitrostat)  0.4 mg PO Q5MIN PRN


   PRN Reason: Chest Pain


Nystatin (Mycostatin Powder)  0 gm TOP BID [facility]


   Last Admin: 02/03/18 08:04 Dose:  1 applic


Ondansetron HCl (Zofran Odt)  4 mg PO Q6H PRN


   PRN Reason: Nausea/Vomiting


Ondansetron HCl (Zofran)  4 mg IVP Q6H PRN


   PRN Reason: Nausea/Vomiting


   Last Admin: 02/02/18 10:14 Dose:  4 mg


Pantoprazole Sodium (Protonix)  40 mg PO DAILY [facility]


   Last Admin: 02/03/18 08:02 Dose:  40 mg


Polyethylene Glycol (Miralax)  17 gm PO DAILY [facility]


   Last Admin: 02/03/18 08:03 Dose:  17 gm


Potassium Chloride (K-Dur)  40 meq PO TID-WM [facility]


   Last Admin: 02/03/18 11:47 Dose:  40 meq


Prednisone (Prednisone)  20 mg PO QAM-[facility]


   Last Admin: 02/03/18 08:03 Dose:  20 mg


Senna (Senokot)  2 tab PO HSPRN PRN


   PRN Reason: Constipation


Sodium Chloride (Flush - Normal Saline)  10 ml IVF Q12HR [facility]


   Last Admin: 02/03/18 08:04 Dose:  10 ml


Sodium Chloride (Flush - Normal Saline)  10 ml IVF PRN PRN


   PRN Reason: Saline Flush


Theophylline (Theophylline Sr)  200 mg PO BID [facility]


Tramadol HCl (Ultram)  50 mg PO Q4H PRN


   PRN Reason: Moderate Pain (4-6)


   Last Admin: 02/03/18 14:10 Dose:  50 mg

## 2018-02-03 NOTE — CON
DATE OF CONSULTATION:  02/02/2018

 

CHIEF COMPLAINT:  Abdominal pain.

 

HISTORY OF PRESENT ILLNESS:  Mr. Moya is a 62-year-old man who presented to the hospital day before 
yesterday with shortness of breath and exacerbation of COPD.  Yesterday, he developed increased abdom
inal distention and tightness and diffuse pressure type pain.  He has been having bowel movements, bu
t they have been small.  He develops the pain, he goes to the bathroom and has a small bowel movement
 and the pain resolves temporarily until the air pressure builds back up in his bowel again and he ag
ain has have a bowel movement to feel better.  He has had some form soft stools without blood in the 
stool.  He has been taking MiraLax at home daily, which he states helps with this, but has been off h
is MiraLax for the last couple of days.  He did have some dry heaves this morning, but that has resol
diana.  No ongoing nausea, vomiting.  He had an x-ray that showed some distention of the colon with air
.

 

PAST MEDICAL HISTORY:  Obstructive sleep apnea and chronic respiratory failure on home oxygen, chroni
c diastolic heart failure, diabetes mellitus type 2, hypertension, dyslipidemia, coronary artery dise
ase status post stent placement and MI, and morbid obesity.

 

PAST SURGICAL HISTORY:  Cardiac stent placement.

 

FAMILY HISTORY:  Negative for GI malignancy.

 

SOCIAL HISTORY:  Quit smoking in 1997.  No drugs, no alcohol.

 

ALLERGIES:  FLOMAX and LEVAQUIN.

 

CURRENT INPATIENT MEDICATIONS:  Aspirin 81 mg daily, cefdinir, clopidogrel, calcium with vitamin D, d
ocusate, enoxaparin, furosemide, insulin, pantoprazole.

 

REVIEW OF SYSTEMS:  Negative x10 systems reviewed except as stated in the history of present illness.


 

PHYSICAL EXAMINATION:

VITAL SIGNS:  Temperature 98.5, pulse 93, blood pressure 165/95.

GENERAL:  He is in no acute distress, but he is tachypneic.

HEENT:  His eyes have no scleral icterus.  Oropharynx is clear without lesions.

NECK:  No cervical or supraclavicular lymphadenopathy.

LUNGS:  Have little airflow to auscultation.

HEART:  Regular rate and rhythm.

ABDOMEN:  Distended and obese.  His bowel sounds are active.  He is minimally tender now.

EXTREMITIES:  2+ pitting lower extremity edema.

 

LABORATORY DATA:  White blood cell count 6.5, hemoglobin 13.7, platelets 174.  INR 1.0.  Creatinine 1
.46, bilirubin 0.6, AST 13, ALT 16.

 

IMPRESSION:

1.  Chronic obstructive pulmonary disease exacerbation.

2.  Obesity.

3.  Sleep apnea.

4.  Diastolic heart failure.

5.  Abdominal distention.  He might have a mild colonic ileus; however, he is passing bowel movements
 multiple times today and yesterday with temporary relief when he does pass the stools.  He is only p
assing small bowel movements with soft formed stool.  He has been on MiraLax daily at home, which he 
states helps and he feels like he could tolerate this currently.  He does not feel like he needs supp
ositories at this time.

 

RECOMMENDATIONS:  We will restart the MiraLax tonight.

## 2018-02-03 NOTE — RAD
ABDOMEN 2 VIEWS:

 

HISTORY: 

A 62-year-old male for followup ileus.

 

COMPARISON: 

2/2/18.

 

FINDINGS: 

There are bilateral pleural effusions and some vascular congestion.  There is no evidence for free in
traperitoneal air.  There is some persistent mild colonic dilatation with air and fluid levels as wel
l as some air and fluid levels in essentially nondilated small bowel.  Overall appearance is stable f
rom the prior study.

 

IMPRESSION: 

Some persistent air fluid levels within borderline dilated colon and nondilated small bowel.  No new 
findings.  No free intraperitoneal air.

 

POS: [facility]

## 2018-02-03 NOTE — EKG
Test Reason : SOB

Blood Pressure : ***/*** mmHG

Vent. Rate : 107 BPM     Atrial Rate : 107 BPM

   P-R Int : 124 ms          QRS Dur : 092 ms

    QT Int : 370 ms       P-R-T Axes : 033 001 025 degrees

   QTc Int : 493 ms

 

Sinus tachycardia with Premature ventricular complexes or Fusion complexes

Possible Left atrial enlargement

Cannot rule out Anterior infarct , age undetermined

Abnormal ECG

 

Confirmed by RU GARCIA, MIRIAM ([id number]),  SHAY THURSTON ([id number]) on 2/3/2018 12:09:16 PM

 

Referred By:             Confirmed By:MIRIAM VENCES MD

## 2018-02-04 LAB
ANION GAP SERPL CALC-SCNC: 14 MMOL/L (ref 10–20)
BUN SERPL-MCNC: 22 MG/DL (ref 8.4–25.7)
CALCIUM SERPL-MCNC: 8.8 MG/DL (ref 7.8–10.44)
CHLORIDE SERPL-SCNC: 104 MMOL/L (ref 98–107)
CO2 SERPL-SCNC: 24 MMOL/L (ref 23–31)
CREAT CL PREDICTED SERPL C-G-VRATE: 114 ML/MIN (ref 70–130)
GLUCOSE SERPL-MCNC: 130 MG/DL (ref 80–115)
POTASSIUM SERPL-SCNC: 3.3 MMOL/L (ref 3.5–5.1)
SODIUM SERPL-SCNC: 139 MMOL/L (ref 136–145)

## 2018-02-04 RX ADMIN — MOMETASONE FUROATE AND FORMOTEROL FUMARATE DIHYDRATE SCH PUFF: 200; 5 AEROSOL RESPIRATORY (INHALATION) at 19:07

## 2018-02-04 RX ADMIN — Medication SCH TAB: at 10:42

## 2018-02-04 RX ADMIN — Medication SCH ML: at 10:59

## 2018-02-04 RX ADMIN — ASPIRIN SCH MG: 81 TABLET ORAL at 10:43

## 2018-02-04 RX ADMIN — Medication SCH TAB: at 16:06

## 2018-02-04 RX ADMIN — MOMETASONE FUROATE AND FORMOTEROL FUMARATE DIHYDRATE SCH PUFF: 200; 5 AEROSOL RESPIRATORY (INHALATION) at 08:25

## 2018-02-04 RX ADMIN — NYSTATIN SCH APPLIC: 100000 POWDER TOPICAL at 10:46

## 2018-02-04 RX ADMIN — NYSTATIN SCH APPLIC: 100000 POWDER TOPICAL at 21:31

## 2018-02-04 RX ADMIN — Medication SCH ML: at 21:31

## 2018-02-04 RX ADMIN — Medication PRN ML: at 05:43

## 2018-02-04 NOTE — PDOC.PN
- Subjective


Encounter Start Date: 02/04/18


Encounter Start Time: 09:30





Patient seen and examined. No new complaints. No overnight events. SOB improving





- Objective


Resuscitation Status: 


 











Resuscitation Status           FULL:Full Resuscitation














MAR Reviewed: Yes


Vital Signs & Weight: 


 Vital Signs (12 hours)











  Temp Pulse Resp BP Pulse Ox


 


 02/04/18 16:02  97.9 F  77  19  153/84 H  93 L


 


 02/04/18 15:06   89  16  


 


 02/04/18 13:00  97.9 F  81  18  154/90 H  94 L


 


 02/04/18 11:23   81  16  


 


 02/04/18 09:10  97.9 F  81  18  132/60  93 L


 


 02/04/18 08:25   71  16  


 


 02/04/18 08:16      96


 


 02/04/18 08:15   71  16  








 Weight











Weight                         322 lb 3.2 oz














I&O: 


 











 02/03/18 02/04/18 02/05/18





 06:59 06:59 06:59


 


Intake Total 1360 1486 


 


Output Total 5350 3300 


 


Balance -3990 -1814 











Result Diagrams: 


 02/03/18 04:59





 02/04/18 04:41


Additional Labs: 


 Accuchecks











  02/04/18 02/04/18 02/04/18





  17:35 11:05 06:03


 


POC Glucose  150 H  116 H  112 H














  02/04/18 02/04/18 02/03/18





  01:54 00:24 20:11


 


POC Glucose  123 H  71  117 H











EKG Reviewed by me: Yes (Tele SR)





Phys Exam





- Physical Examination


Constitutional: NAD


Respiratory: no wheezing, no rhonchi


Cardiovascular: RRR, no rub


Gastrointestinal: soft, positive bowel sounds


Musculoskeletal: edema present (improving)


Neurological: moves all 4 limbs





Dx/Plan





- Plan


continue antibiotics, DVT proph w/lovenox, DVT proph w/SCDs





IMPRESSION:


1.  Acute on chronic diastolic heart failure. on IV Lasix


2.  Chronic obstructive pulmonary disease exacerbation. on Nebs/steroids/Atbx


3.  Sepsis secondary to suspected pneumonia, questionable pneumococcal.


4.  Hypertension.


5.  Obstructive sleep apnea, on CPAP.


6.  New onset Abd pain - due to mild Ileus. improved


7.  Diabetes mellitus, type 2. on sliding scale with Levemir


8.  Chronic respiratory failure, on home oxygen.


9.  Coronary artery disease, status post stent placement.


10.  Diabetic neuropathy.


11.  Chronic pain syndrome.


12.  Hyponatremia.


13.  Chronic kidney disease, stage 3.


14.  Elevated troponins, probably secondary to congestive heart failure./ 

Morbid obesity BMI 52 /hypokalemia


 


PLAN:  


* Cont Losartan per Cardiology


* Cont IV Lasix 60 mg BID with fluid rest


* Replace Potassium


* AM labs


* Cont to monitor


* BMP in AM


* Cont Miralax


* Cardiology following


* DC Glipizide


* Reduce PM Levemir to 10 units HS











Review of Systems





- Review of Systems


Respiratory: SOB with Excertion.  negative: Cough, Dry, Shortness of Breath, 

Hemoptysis, Pleuritic Pain, Sputum, Wheezing


Cardiovascular: negative: chest pain, palpitations, orthopnea, paroxysmal 

nocturnal dyspnea, edema, light headedness, other


Gastrointestinal: negative: Nausea, Vomiting, Abdominal Pain, Diarrhea, 

Constipation, Melena, Hematochezia





- Medications/Allergies


Allergies/Adverse Reactions: 


 Allergies











Allergy/AdvReac Type Severity Reaction Status Date / Time


 


levofloxacin [From Levaquin] Allergy   Verified 01/31/18 23:55


 


tamsulosin [From Flomax] Allergy   Verified 01/31/18 23:55











Medications: 


 Current Medications





Acetaminophen (Tylenol)  650 mg PO Q4H PRN


   PRN Reason: Headache/Fever or Pain


Albuterol/Ipratropium (Duoneb)  3 ml NEB [id number] [facility]


   Last Admin: 02/04/18 15:06 Dose:  3 ml


Albuterol/Ipratropium (Duoneb)  3 ml NEB [id number] PRN


   PRN Reason: SOB &/or Wheezing


Aspirin (Ecotrin)  81 mg PO DAILY [facility]


   Last Admin: 02/04/18 10:43 Dose:  81 mg


Atorvastatin Calcium (Lipitor)  20 mg PO [facility]


   Last Admin: 02/03/18 21:09 Dose:  20 mg


Benzonatate (Tessalon)  100 mg PO TID PRN


   PRN Reason: Cough


Bisacodyl (Dulcolax)  10 mg NV Q24H PRN


   PRN Reason: Constipation


Buspirone HCl (Buspar)  5 mg PO BID [facility]


   Last Admin: 02/04/18 10:43 Dose:  5 mg


Calcium Carbonate (Tums)  1,000 mg PO Q4H PRN


   PRN Reason: Heartburn  or Indigestion


   Last Admin: 02/02/18 05:24 Dose:  1,000 mg


Calcium/Vitamin D (Caltrate 600 + Vit D)  1 tab PO BID-[facility]


   Last Admin: 02/04/18 16:06 Dose:  1 tab


Cefdinir (Omnicef)  300 mg PO DAILY [facility]


   Last Admin: 02/04/18 10:43 Dose:  300 mg


Clonazepam (Klonopin)  2 mg PO [facility]


   Last Admin: 02/03/18 21:09 Dose:  2 mg


Clopidogrel Bisulfate (Plavix)  [facility]


   Last Admin: 02/04/18 10:43 Dose:  75 mg


Dextrose/Water (Dextrose 50%)  25 gm SLOW IVP PRN PRN


   PRN Reason: Hypoglycemia


Docusate Sodium (Colace)  100 mg PO BID [facility]


   Last Admin: 02/04/18 10:44 Dose:  Not Given


Enoxaparin Sodium (Lovenox)  40 mg SC 0900 [facility]


   Last Admin: 02/04/18 10:44 Dose:  40 mg


Fluoxetine HCl (Prozac)  50 mg PO DAILY [facility]


   Last Admin: 02/04/18 10:45 Dose:  50 mg


Furosemide (Lasix)  60 mg SLOW IVP 0600,1400 [facility]


   Last Admin: 02/04/18 13:25 Dose:  60 mg


Gabapentin (Neurontin)  300 mg PO TID [facility]


   Last Admin: 02/04/18 16:06 Dose:  300 mg


Glucagon (Glucagon)  1 mg IM PRN PRN


   PRN Reason: Hypoglycemia


Dextrose/Water (D5w)  1,000 mls @ 0 mls/hr IV .Q0M PRN; As Directed


   PRN Reason: Hypoglycemia


Insulin Detemir 25 units/ (Miscellaneous Medication)  0.25 mls @ 0 mls/hr SC 

[facility]


   Last Admin: 02/04/18 11:03 Dose:  0.25 mls


Insulin Detemir 10 units/ (Miscellaneous Medication)  0.1 mls @ 0 mls/hr SC [facility]


Insulin Human Regular (Humulin R)  0 units SC .BEDTIME SLIDING SC PRN


   PRN Reason: Bedtime Correctional Scale


   Last Admin: 02/01/18 20:43 Dose:  2 unit


Insulin Human Regular (Humulin R)  0 units SC .AGGRESSIVE SLIDING  PRN


   PRN Reason: Aggressive Sliding Scale


   Last Admin: 02/02/18 16:37 Dose:  3 unit


Losartan Potassium (Cozaar)  25 mg PO BID [facility]


   Last Admin: 02/04/18 10:45 Dose:  25 mg


Metolazone (Zaroxolyn)  2.5 mg PO 0830 [facility]


Mometasone Furoate/Formoterol Fumar (Dulera 200 Mcg/5 Mcg Inhaler)  2 puff INH 

BID-RT [facility]


   Last Admin: 02/04/18 08:25 Dose:  2 puff


Montelukast Sodium (Singulair)  10 mg PO HS [facility]


   Last Admin: 02/03/18 21:09 Dose:  10 mg


Nebivolol (Bystolic)  2.5 mg PO DAILY [facility]


   Last Admin: 02/04/18 10:46 Dose:  2.5 mg


Nitroglycerin (Nitrostat)  0.4 mg PO Q5MIN PRN


   PRN Reason: Chest Pain


Nystatin (Mycostatin Powder)  0 gm TOP BID [facility]


   Last Admin: 02/04/18 10:46 Dose:  1 applic


Ondansetron HCl (Zofran Odt)  4 mg PO Q6H PRN


   PRN Reason: Nausea/Vomiting


   Last Admin: 02/04/18 00:30 Dose:  4 mg


Ondansetron HCl (Zofran)  4 mg IVP Q6H PRN


   PRN Reason: Nausea/Vomiting


   Last Admin: 02/02/18 10:14 Dose:  4 mg


Pantoprazole Sodium (Protonix)  40 mg PO DAILY [facility]


   Last Admin: 02/04/18 10:46 Dose:  40 mg


Polyethylene Glycol (Miralax)  17 gm PO DAILY [facility]


   Last Admin: 02/04/18 10:46 Dose:  17 gm


Potassium Chloride (K-Dur)  40 meq PO TID-[facility]


   Last Admin: 02/04/18 16:06 Dose:  40 meq


Prednisone (Prednisone)  20 mg PO QAM-[facility]


   Last Admin: 02/04/18 10:43 Dose:  20 mg


Senna (Senokot)  2 tab PO HSPRN PRN


   PRN Reason: Constipation


Sodium Chloride (Flush - Normal Saline)  10 ml IVF Q12HR [facility]


   Last Admin: 02/04/18 10:59 Dose:  10 ml


Sodium Chloride (Flush - Normal Saline)  10 ml IVF PRN PRN


   PRN Reason: Saline Flush


   Last Admin: 02/04/18 05:43 Dose:  10 ml


Theophylline (Theophylline Sr)  200 mg PO BID [facility]


   Last Admin: 02/04/18 10:46 Dose:  200 mg


Tramadol HCl (Ultram)  50 mg PO Q4H PRN


   PRN Reason: Moderate Pain (4-6)


   Last Admin: 02/03/18 21:17 Dose:  50 mg

## 2018-02-05 LAB
ALBUMIN SERPL BCG-MCNC: 3.4 G/DL (ref 3.4–4.8)
ANION GAP SERPL CALC-SCNC: 15 MMOL/L (ref 10–20)
BUN SERPL-MCNC: 21 MG/DL (ref 8.4–25.7)
BUN/CREAT SERPL: 14.09
CALCIUM SERPL-MCNC: 9 MG/DL (ref 7.8–10.44)
CHLORIDE SERPL-SCNC: 102 MMOL/L (ref 98–107)
CO2 SERPL-SCNC: 22 MMOL/L (ref 23–31)
CREAT CL PREDICTED SERPL C-G-VRATE: 106 ML/MIN (ref 70–130)
GLUCOSE SERPL-MCNC: 156 MG/DL (ref 80–115)
HGB BLD-MCNC: 14.9 G/DL (ref 14–18)
MAGNESIUM SERPL-MCNC: 2.3 MG/DL (ref 1.6–2.6)
PLATELET # BLD AUTO: 190 THOU/UL (ref 130–400)
POTASSIUM SERPL-SCNC: 3.2 MMOL/L (ref 3.5–5.1)
SODIUM SERPL-SCNC: 136 MMOL/L (ref 136–145)

## 2018-02-05 RX ADMIN — NYSTATIN SCH APPLIC: 100000 POWDER TOPICAL at 09:10

## 2018-02-05 RX ADMIN — Medication PRN ML: at 05:24

## 2018-02-05 RX ADMIN — Medication SCH TAB: at 09:09

## 2018-02-05 RX ADMIN — INSULIN HUMAN PRN UNIT: 100 INJECTION, SOLUTION PARENTERAL at 18:32

## 2018-02-05 RX ADMIN — NYSTATIN SCH APPLIC: 100000 POWDER TOPICAL at 21:44

## 2018-02-05 RX ADMIN — MOMETASONE FUROATE AND FORMOTEROL FUMARATE DIHYDRATE SCH PUFF: 200; 5 AEROSOL RESPIRATORY (INHALATION) at 20:06

## 2018-02-05 RX ADMIN — Medication SCH ML: at 09:10

## 2018-02-05 RX ADMIN — ASPIRIN SCH MG: 81 TABLET ORAL at 09:10

## 2018-02-05 RX ADMIN — Medication SCH TAB: at 17:00

## 2018-02-05 RX ADMIN — Medication SCH ML: at 21:43

## 2018-02-05 RX ADMIN — MOMETASONE FUROATE AND FORMOTEROL FUMARATE DIHYDRATE SCH PUFF: 200; 5 AEROSOL RESPIRATORY (INHALATION) at 07:40

## 2018-02-05 NOTE — PDOC.PN
- Subjective


Encounter Start Date: 02/05/18


Encounter Start Time: 13:00





Patient seen and examined. No new complaints. No overnight events. SOB 

improving.





- Objective


Resuscitation Status: 


 











Resuscitation Status           FULL:Full Resuscitation














MAR Reviewed: Yes


Vital Signs & Weight: 


 Vital Signs (12 hours)











  Temp Pulse Resp BP Pulse Ox


 


 02/05/18 14:22   75  16   93 L


 


 02/05/18 11:39  97.9 F  75  18  132/80  94 L


 


 02/05/18 10:21   74  16   94 L


 


 02/05/18 07:50  97.4 F L  63  16  134/78  96


 


 02/05/18 07:40   82  18   94 L


 


 02/05/18 04:10      92 L


 


 02/05/18 04:00  97.3 F L  71  18  126/70  92 L








 Weight











Weight                         320 lb 9.6 oz














I&O: 


 











 02/04/18 02/05/18 02/06/18





 06:59 06:59 06:59


 


Intake Total 1486 266 


 


Output Total 3300 6900 


 


Balance -2236 -7819 











Result Diagrams: 


 02/05/18 04:45





 02/05/18 04:45


Additional Labs: 


 Accuchecks











  02/05/18 02/05/18 02/04/18





  06:08 01:47 19:57


 


POC Glucose  148 H  163 H  242 H














  02/04/18





  17:35


 


POC Glucose  150 H














Phys Exam





- Physical Examination


Constitutional: NAD


Respiratory: no wheezing, no rhonchi


Cardiovascular: RRR, no rub


Gastrointestinal: soft, non-tender, positive bowel sounds


Musculoskeletal: edema present (improving)


Neurological: moves all 4 limbs





Dx/Plan





- Plan


DVT proph w/lovenox





IMPRESSION:


1.  Acute on chronic diastolic heart failure. improving


2.  Chronic obstructive pulmonary disease exacerbation. on Nebs/steroids/Atbx


3.  Sepsis secondary to suspected pneumonia, questionable pneumococcal.


4.  Hypertension.


5.  Obstructive sleep apnea, on CPAP.


6.  New onset Abd pain - due to mild Ileus. improved


7.  Diabetes mellitus, type 2. on sliding scale with Levemir


8.  Chronic respiratory failure, on home oxygen.


9.  Coronary artery disease, status post stent placement.


10.  Diabetic neuropathy.


11.  Chronic pain syndrome.


12.  Hyponatremia.


13.  Chronic kidney disease, stage 3.


14.  Elevated troponins, probably secondary to congestive heart failure./ 

Morbid obesity BMI 52 /hypokalemia


 


PLAN:  


* Cont Losartan per Cardiology


* Lasix changed to PO


* Replace Potassium


* AM labs


* DC Metolazone


* Cont to monitor


* Cont Miralax


* Cardiology following


* DC in AM if stable








Review of Systems





- Review of Systems


Respiratory: negative: Cough, Dry, Shortness of Breath, Hemoptysis, SOB with 

Excertion, Pleuritic Pain, Sputum, Wheezing


Cardiovascular: negative: chest pain, palpitations, orthopnea, paroxysmal 

nocturnal dyspnea, edema, light headedness





- Medications/Allergies


Allergies/Adverse Reactions: 


 Allergies











Allergy/AdvReac Type Severity Reaction Status Date / Time


 


levofloxacin [From Levaquin] Allergy   Verified 01/31/18 23:55


 


tamsulosin [From Flomax] Allergy   Verified 01/31/18 23:55











Medications: 


 Current Medications





Acetaminophen (Tylenol)  650 mg PO Q4H PRN


   PRN Reason: Headache/Fever or Pain


Albuterol/Ipratropium (Duoneb)  3 ml NEB [id number] [facility]


   Last Admin: 02/05/18 14:22 Dose:  3 ml


Albuterol/Ipratropium (Duoneb)  3 ml NEB [id number] PRN


   PRN Reason: SOB &/or Wheezing


Aspirin (Ecotrin)  81 mg PO DAILY [facility]


   Last Admin: 02/05/18 09:10 Dose:  81 mg


Atorvastatin Calcium (Lipitor)  20 mg PO [facility]


   Last Admin: 02/04/18 21:29 Dose:  20 mg


Benzonatate (Tessalon)  100 mg PO TID PRN


   PRN Reason: Cough


Bisacodyl (Dulcolax)  10 mg VT Q24H PRN


   PRN Reason: Constipation


Buspirone HCl (Buspar)  5 mg PO BID [facility]


   Last Admin: 02/05/18 09:09 Dose:  5 mg


Calcium Carbonate (Tums)  1,000 mg PO Q4H PRN


   PRN Reason: Heartburn  or Indigestion


   Last Admin: 02/02/18 05:24 Dose:  1,000 mg


Calcium/Vitamin D (Caltrate 600 + Vit D)  1 tab PO BID[facility]


   Last Admin: 02/05/18 09:09 Dose:  1 tab


Cefdinir (Omnicef)  300 mg PO DAILY [facility]


   Last Admin: 02/05/18 09:08 Dose:  300 mg


Clonazepam (Klonopin)  2 mg PO [facility]


   Last Admin: 02/04/18 21:30 Dose:  2 mg


Clopidogrel Bisulfate (Plavix)  [facility] [place]


   Last Admin: 02/05/18 09:10 Dose:  75 mg


Dextrose/Water (Dextrose 50%)  25 gm SLOW IVP PRN PRN


   PRN Reason: Hypoglycemia


Docusate Sodium (Colace)  100 mg PO BID [facility]


   Last Admin: 02/05/18 09:10 Dose:  Not Given


Enoxaparin Sodium (Lovenox)  40 mg SC 0900 [facility]


   Last Admin: 02/05/18 09:07 Dose:  40 mg


Fluoxetine HCl (Prozac)  50 mg PO DAILY [facility]


   Last Admin: 02/05/18 09:09 Dose:  50 mg


Furosemide (Lasix)  40 mg PO DAILY-AC [facility]


Gabapentin (Neurontin)  300 mg PO TID [facility]


   Last Admin: 02/05/18 14:33 Dose:  300 mg


Glucagon (Glucagon)  1 mg IM PRN PRN


   PRN Reason: Hypoglycemia


Dextrose/Water (D5w)  1,000 mls @ 0 mls/hr IV .Q0M PRN; As Directed


   PRN Reason: Hypoglycemia


Insulin Detemir 25 units/ (Miscellaneous Medication)  0.25 mls @ 0 mls/hr SC 

[facility]


   Last Admin: 02/05/18 13:21 Dose:  0.25 mls


Insulin Detemir 10 units/ (Miscellaneous Medication)  0.1 mls @ 0 mls/hr SC [facility]


   Last Admin: 02/04/18 21:35 Dose:  0.1 mls


Insulin Human Regular (Humulin R)  0 units SC .BEDTIME SLIDING SC PRN


   PRN Reason: Bedtime Correctional Scale


   Last Admin: 02/01/18 20:43 Dose:  2 unit


Insulin Human Regular (Humulin R)  0 units SC .AGGRESSIVE SLIDING  PRN


   PRN Reason: Aggressive Sliding Scale


   Last Admin: 02/02/18 16:37 Dose:  3 unit


Losartan Potassium (Cozaar)  25 mg PO BID [facility]


   Last Admin: 02/05/18 09:07 Dose:  25 mg


Mometasone Furoate/Formoterol Fumar (Dulera 200 Mcg/5 Mcg Inhaler)  2 puff INH 

BID-RT [facility]


   Last Admin: 02/05/18 07:40 Dose:  2 puff


Montelukast Sodium (Singulair)  10 mg PO [facility]


   Last Admin: 02/04/18 21:30 Dose:  10 mg


Nebivolol (Bystolic)  2.5 mg PO DAILY [facility]


   Last Admin: 02/05/18 09:08 Dose:  2.5 mg


Nitroglycerin (Nitrostat)  0.4 mg PO Q5MIN PRN


   PRN Reason: Chest Pain


Nystatin (Mycostatin Powder)  0 gm TOP BID [facility]


   Last Admin: 02/05/18 09:10 Dose:  1 applic


Ondansetron HCl (Zofran Odt)  4 mg PO Q6H PRN


   PRN Reason: Nausea/Vomiting


   Last Admin: 02/04/18 00:30 Dose:  4 mg


Ondansetron HCl (Zofran)  4 mg IVP Q6H PRN


   PRN Reason: Nausea/Vomiting


   Last Admin: 02/02/18 10:14 Dose:  4 mg


Pantoprazole Sodium (Protonix)  40 mg PO DAILY [facility]


   Last Admin: 02/05/18 09:10 Dose:  40 mg


Polyethylene Glycol (Miralax)  17 gm PO DAILY [facility]


   Last Admin: 02/05/18 09:06 Dose:  17 gm


Potassium Chloride (K-Dur)  40 meq PO QID-WM [facility]


   Last Admin: 02/05/18 11:43 Dose:  40 meq


Prednisone (Prednisone)  20 mg PO QAM-WM [facility]


   Last Admin: 02/05/18 09:10 Dose:  20 mg


Senna (Senokot)  2 tab PO HSPRN PRN


   PRN Reason: Constipation


Sodium Chloride (Flush - Normal Saline)  10 ml IVF Q12HR [facility]


   Last Admin: 02/05/18 09:10 Dose:  10 ml


Sodium Chloride (Flush - Normal Saline)  10 ml IVF PRN PRN


   PRN Reason: Saline Flush


   Last Admin: 02/05/18 05:24 Dose:  10 ml


Spironolactone (Aldactone)  12.5 mg PO QAM-WM [facility]


Theophylline (Theophylline Sr)  200 mg PO BID [facility]


   Last Admin: 02/05/18 09:08 Dose:  200 mg


Tramadol HCl (Ultram)  50 mg PO Q4H PRN


   PRN Reason: Moderate Pain (4-6)


   Last Admin: 02/03/18 21:17 Dose:  50 mg

## 2018-02-05 NOTE — PDOC.CTH
Cardiology Progress Note





- Subjective





He has diuresed a total of 16L so far. He feels great. 





- Objective


 Vital Signs











  Temp Pulse Resp BP Pulse Ox


 


 02/05/18 16:56  98.4 F  92  20  151/82 H  93 L


 


 02/05/18 14:22   75  16   93 L


 


 02/05/18 11:39  97.9 F  75  18  132/80  94 L


 


 02/05/18 10:21   74  16   94 L


 


 02/05/18 07:50  97.4 F L  63  16  134/78  96


 


 02/05/18 07:40   82  18   94 L








 











Weight                         320 lb 9.6 oz














 











 02/04/18 02/05/18 02/06/18





 06:59 06:59 06:59


 


Intake Total 1486 266 


 


Output Total 3300 6900 


 


Balance -0352 -2041 














- Physical Examination


General/Neuro: alert & oriented x3, NAD


Neck: no JVD present


Lungs: unlabored respirations


Heart: RRR


Abdomen: NT/ND


Extremities: + edema B (no edema.)





- Telemetry


Telemetry Rhythm: NSR





- Labs


Result Diagrams: 


 02/05/18 04:45





 02/05/18 04:45


 Troponin/CKMB











CK-MB (CK-2)  1.6 ng/mL (0-6.6)   01/31/18  16:56    


 


Troponin I  0.033 ng/mL (< 0.028)  H  01/31/18  23:27    














- Assessment/Plan








1. Acute on chronic diastolic heart failure


2. RV failure


3. COPD


4. Morbid obesity


5. Severe sleep apnea








PLAN:


- Switch IV Lasix to PO. 


- Replace K


- Will send home on a daily 40 mg PO Lasix AM dose scheduled. I gave 

instructions to him to weigh himself daily and if he gains 3 pounds or more in 

one day he will double up on the Lasix to twice a day for 2 or 3 days until he 

looses those extra pounds. 


- He may be discharged home at any time from cardiac perspective. 


- Continue aldactone, ARB, will increase nebivolol to 5 mg daily.


- Follow up in the office in 1 month.

## 2018-02-06 VITALS — DIASTOLIC BLOOD PRESSURE: 54 MMHG | TEMPERATURE: 98 F | SYSTOLIC BLOOD PRESSURE: 103 MMHG

## 2018-02-06 LAB
ALBUMIN SERPL BCG-MCNC: 3.6 G/DL (ref 3.4–4.8)
ANION GAP SERPL CALC-SCNC: 15 MMOL/L (ref 10–20)
BUN SERPL-MCNC: 25 MG/DL (ref 8.4–25.7)
BUN/CREAT SERPL: 14.45
CALCIUM SERPL-MCNC: 9.9 MG/DL (ref 7.8–10.44)
CHLORIDE SERPL-SCNC: 102 MMOL/L (ref 98–107)
CO2 SERPL-SCNC: 24 MMOL/L (ref 23–31)
CREAT CL PREDICTED SERPL C-G-VRATE: 91 ML/MIN (ref 70–130)
GLUCOSE SERPL-MCNC: 134 MG/DL (ref 80–115)
MAGNESIUM SERPL-MCNC: 2.2 MG/DL (ref 1.6–2.6)
POTASSIUM SERPL-SCNC: 3.5 MMOL/L (ref 3.5–5.1)
SODIUM SERPL-SCNC: 137 MMOL/L (ref 136–145)

## 2018-02-06 RX ADMIN — NYSTATIN SCH APPLIC: 100000 POWDER TOPICAL at 08:45

## 2018-02-06 RX ADMIN — Medication SCH ML: at 08:46

## 2018-02-06 RX ADMIN — MOMETASONE FUROATE AND FORMOTEROL FUMARATE DIHYDRATE SCH PUFF: 200; 5 AEROSOL RESPIRATORY (INHALATION) at 08:04

## 2018-02-06 RX ADMIN — Medication SCH TAB: at 08:44

## 2018-02-06 RX ADMIN — ASPIRIN SCH MG: 81 TABLET ORAL at 08:43

## 2018-02-06 NOTE — DIS
DATE OF DISCHARGE:  02/06/2018

 

DISCHARGE DISPOSITION:  Home with [facility].

 

FOLLOWUP:  Follow up with Dr. Stephan Garcia in 1 week.

 

ALLERGIES:  The patient is allergic to LEVAQUIN and FLOMAX.

 

The patient was seen and examined on the day of discharge.  Denies any new complaints.  No chest pain
, shortness of breath, palpitations.  His weight on the day of discharge is 319 pounds compared to 33
3 pounds on admission.

 

DISCHARGE MEDICATIONS:

1.  Omnicef 300 mg daily.

2.  Lasix 40 mg daily.  He was advised to take an extra dose if weight gain of 3 pounds.

3.  Cozaar 25 mg b.i.d.

4.  Aldactone dose has been reduced to 12.5 mg daily.

5.  Bystolic 5 mg daily.

6.  MiraLax daily.

7.  Other home medications were resumed.

 

INPATIENT CONSULTANTS:  Cardiology, Dr. Gustafson.

 

BRIEF HOSPITAL COURSE:  Patient is a 62-year-old male with chronic diastolic heart failure, obstructi
ve sleep apnea on CPAP, chronic respiratory failure, on home oxygen, COPD on chronic steroids, presen
maikol to the hospital with worsening shortness of breath.  Please note that patient had gained 11 pound
s in the last 10 days or so.  Please refer to the history and physical for further details.

 

The patient was admitted to the hospital with a diagnosis of congestive heart failure exacerbation.  
He was started on Lasix drip that was changed to IV push.  He showed good response with diuretics.  H
is weight on discharge is 319 pounds compared to 333 pounds on admission.  He was extensively 
ed on congestive heart failure as well as fluid restriction.  He has been started on losartan as well
 as Bystolic.  Aldactone dose has been reduced.  Lasix dose has been reduced to once a day.  He would
 need repeat labs later this week.  His creatinine on the day of discharge was 1.73 compared 1.75 on 
admission.  His lowest creatinine this admission was 1.34.  He has been cleared by Cardiology for dis
charge.  There was a possible concern for pneumonia on admission for which he was placed on antibioti
cs.  He will continue Omnicef for another 3 days.

 

FINAL DIAGNOSES:

1.  Acute on chronic diastolic heart failure exacerbation, improved.

2.  Chronic obstructive pulmonary disease exacerbation.

3.  Sepsis with suspected pneumonia.  Questionable pneumococcal.

4.  Hypertension.

5.  Obstructive sleep apnea on CPAP.

6.  Abdominal pain on the second day of admission.  KUB was consistent with mild ileus.  MiraLax was 
recommended by Gastroenterology.

7.  Diabetes mellitus type 2.

8.  Chronic respiratory failure, on home oxygen.

9.  Chronic obstructive pulmonary disease on chronic steroids at 20 mg daily per patient's.

10.  Elevated troponins, probably secondary to congestive heart failure.

11.  Morbid obesity with a BMI of 52.

12.  Hypokalemia.

13.  Chronic kidney disease stage 3.

14.  Hyponatremia.

15.  Chronic pain syndrome.

16.  Diabetic neuropathy.

17.  Chronic respiratory failure, on home oxygen.

18.  Coronary artery disease, status post stent placement.

 

Plan of care was discussed with the patient and the family in detail.  They stated understanding.

## 2018-02-06 NOTE — PDOC.CTH
Cardiology Progress Note





- Subjective





he is doing well. No chest pan, lightheadedness, syncope presyncope. 





- Objective


 Vital Signs











  Temp Pulse Resp BP Pulse Ox


 


 02/06/18 11:25  98.0 F  68  20  103/54 L  93 L


 


 02/06/18 10:27   64  16   95


 


 02/06/18 08:40  96.6 F L  65  20   92 L


 


 02/06/18 08:39  96.6 F L  65  20  119/60  92 L


 


 02/06/18 08:06   60  16  


 


 02/06/18 04:38      93 L


 


 02/06/18 04:00  97.6 F  71  18  142/65 H  93 L








 











Weight                         319 lb 11.2 oz














 











 02/05/18 02/06/18 02/07/18





 06:59 06:59 06:59


 


Intake Total 266 1440 


 


Output Total 6900 2450 1250


 


Balance -6634 -1010 -1250














- Physical Examination


General/Neuro: alert & oriented x3, NAD


Neck: no JVD present


Lungs: unlabored respirations


Heart: RRR


Abdomen: NT/ND


Extremities: + edema B (trace)





- Telemetry


Telemetry Rhythm: NSR





- Labs


Result Diagrams: 


 02/05/18 04:45





 02/06/18 04:58


 Troponin/CKMB











CK-MB (CK-2)  1.6 ng/mL (0-6.6)   01/31/18  16:56    


 


Troponin I  0.033 ng/mL (< 0.028)  H  01/31/18  23:27    














- Assessment/Plan





1. Acute on chronic diastolic heart failure


2. RV failure


3. COPD


4. Morbid obesity


5. Severe sleep apnea








PLAN:


- Creatinine bumped up a little today. Will give back some fluid. 500 ml IV 

bolus. 


- Continue other meds. 


- May discharge home with BMP in 2-3 days. 


- Will follow up in the office in 1 month.

## 2018-02-21 ENCOUNTER — HOSPITAL ENCOUNTER (EMERGENCY)
Dept: HOSPITAL 92 - ERS | Age: 63
Discharge: HOME | End: 2018-02-21
Payer: MEDICARE

## 2018-02-21 DIAGNOSIS — F17.220: ICD-10-CM

## 2018-02-21 DIAGNOSIS — I25.2: ICD-10-CM

## 2018-02-21 DIAGNOSIS — I11.0: ICD-10-CM

## 2018-02-21 DIAGNOSIS — I50.9: ICD-10-CM

## 2018-02-21 DIAGNOSIS — F32.9: ICD-10-CM

## 2018-02-21 DIAGNOSIS — J44.1: Primary | ICD-10-CM

## 2018-02-21 DIAGNOSIS — E11.9: ICD-10-CM

## 2018-02-21 LAB
ALBUMIN SERPL BCG-MCNC: 3.7 G/DL (ref 3.4–4.8)
ALP SERPL-CCNC: 82 U/L (ref 40–150)
ALT SERPL W P-5'-P-CCNC: 18 U/L (ref 8–55)
ANALYZER IN CARDIO: (no result)
ANION GAP SERPL CALC-SCNC: 15 MMOL/L (ref 10–20)
APTT PPP: 28.5 SEC (ref 22.9–36.1)
AST SERPL-CCNC: 15 U/L (ref 5–34)
BASE EXCESS STD BLDA CALC-SCNC: -2.2 MEQ/L
BASOPHILS # BLD AUTO: 0 THOU/UL (ref 0–0.2)
BASOPHILS NFR BLD AUTO: 0.4 % (ref 0–1)
BILIRUB SERPL-MCNC: 0.3 MG/DL (ref 0.2–1.2)
BUN SERPL-MCNC: 29 MG/DL (ref 8.4–25.7)
CA-I BLDA-SCNC: 1.2 MMOL/L (ref 1.12–1.3)
CALCIUM SERPL-MCNC: 9.3 MG/DL (ref 7.8–10.44)
CHLORIDE SERPL-SCNC: 107 MMOL/L (ref 98–107)
CK MB SERPL-MCNC: 3.6 NG/ML (ref 0–6.6)
CK SERPL-CCNC: 270 U/L (ref 30–200)
CO2 SERPL-SCNC: 19 MMOL/L (ref 23–31)
CREAT CL PREDICTED SERPL C-G-VRATE: 0 ML/MIN (ref 70–130)
EOSINOPHIL # BLD AUTO: 0.4 THOU/UL (ref 0–0.7)
EOSINOPHIL NFR BLD AUTO: 4.1 % (ref 0–10)
GLOBULIN SER CALC-MCNC: 2.5 G/DL (ref 2.4–3.5)
GLUCOSE SERPL-MCNC: 209 MG/DL (ref 80–115)
HCO3 BLDA-SCNC: 22.8 MEQ/L (ref 22–26)
HCT VFR BLDA CALC: 44.1 % (ref 42–52)
HGB BLD-MCNC: 14.1 G/DL (ref 14–18)
HGB BLDA-MCNC: 14 G/DL (ref 14–18)
INR PPP: 0.9
LIPASE SERPL-CCNC: 63 U/L (ref 8–78)
LYMPHOCYTES # BLD: 2.2 THOU/UL (ref 1.2–3.4)
LYMPHOCYTES NFR BLD AUTO: 21.2 % (ref 21–51)
MCH RBC QN AUTO: 27.1 PG (ref 27–31)
MCV RBC AUTO: 84.4 FL (ref 80–94)
MONOCYTES # BLD AUTO: 0.9 THOU/UL (ref 0.11–0.59)
MONOCYTES NFR BLD AUTO: 8.7 % (ref 0–10)
NEUTROPHILS # BLD AUTO: 6.6 THOU/UL (ref 1.4–6.5)
NEUTROPHILS NFR BLD AUTO: 65.5 % (ref 42–75)
O2 A-A PPRESDIFF RESPIRATORY: 54.87 MM[HG] (ref 0–20)
PCO2 BLDA: 40.3 MMHG (ref 35–45)
PH BLDA: 7.37 [PH] (ref 7.35–7.45)
PLATELET # BLD AUTO: 178 THOU/UL (ref 130–400)
PO2 BLDA: 93 MMHG (ref 80–100)
POTASSIUM SERPL-SCNC: 3.9 MMOL/L (ref 3.5–5.1)
PROTHROMBIN TIME: 12.6 SEC (ref 12–14.7)
RBC # BLD AUTO: 5.19 MILL/UL (ref 4.7–6.1)
SODIUM SERPL-SCNC: 137 MMOL/L (ref 136–145)
SPECIMEN DRAWN FROM PATIENT: (no result)
TROPONIN I SERPL DL<=0.01 NG/ML-MCNC: 0.02 NG/ML (ref ?–0.03)
TROPONIN I SERPL DL<=0.01 NG/ML-MCNC: 0.03 NG/ML (ref ?–0.03)
WBC # BLD AUTO: 10.1 THOU/UL (ref 4.8–10.8)

## 2018-02-21 PROCEDURE — 82553 CREATINE MB FRACTION: CPT

## 2018-02-21 PROCEDURE — 36415 COLL VENOUS BLD VENIPUNCTURE: CPT

## 2018-02-21 PROCEDURE — 83880 ASSAY OF NATRIURETIC PEPTIDE: CPT

## 2018-02-21 PROCEDURE — 71045 X-RAY EXAM CHEST 1 VIEW: CPT

## 2018-02-21 PROCEDURE — 83690 ASSAY OF LIPASE: CPT

## 2018-02-21 PROCEDURE — 85610 PROTHROMBIN TIME: CPT

## 2018-02-21 PROCEDURE — 84484 ASSAY OF TROPONIN QUANT: CPT

## 2018-02-21 PROCEDURE — 85025 COMPLETE CBC W/AUTO DIFF WBC: CPT

## 2018-02-21 PROCEDURE — 80053 COMPREHEN METABOLIC PANEL: CPT

## 2018-02-21 PROCEDURE — 82805 BLOOD GASES W/O2 SATURATION: CPT

## 2018-02-21 PROCEDURE — 94640 AIRWAY INHALATION TREATMENT: CPT

## 2018-02-21 PROCEDURE — 85730 THROMBOPLASTIN TIME PARTIAL: CPT

## 2018-02-21 PROCEDURE — 93005 ELECTROCARDIOGRAM TRACING: CPT

## 2018-02-21 NOTE — RAD
PORTABLE CHEST ONE VIEW:

 

Date: 2-21-18 

Time: 1:46 p.m.

 

History: Dyspnea. 

 

FINDINGS/IMPRESSION: 

Comparison is made with the exam of 1-31-18. 

 

The heart size is borderline. The lungs are well expanded with mild pulmonary vascular congestion. No
 focal areas of consolidation, pneumothorax, or large effusions are seen. 

 

POS: [facility]

## 2018-03-12 ENCOUNTER — HOSPITAL ENCOUNTER (INPATIENT)
Dept: HOSPITAL 92 - ERS | Age: 63
LOS: 3 days | Discharge: HOME HEALTH | DRG: 683 | End: 2018-03-15
Attending: HOSPITALIST | Admitting: HOSPITALIST
Payer: MEDICARE

## 2018-03-12 VITALS — BODY MASS INDEX: 49.1 KG/M2

## 2018-03-12 DIAGNOSIS — I13.0: ICD-10-CM

## 2018-03-12 DIAGNOSIS — E78.5: ICD-10-CM

## 2018-03-12 DIAGNOSIS — N18.9: ICD-10-CM

## 2018-03-12 DIAGNOSIS — E87.2: ICD-10-CM

## 2018-03-12 DIAGNOSIS — Z99.81: ICD-10-CM

## 2018-03-12 DIAGNOSIS — Z85.828: ICD-10-CM

## 2018-03-12 DIAGNOSIS — E86.0: ICD-10-CM

## 2018-03-12 DIAGNOSIS — N17.9: Primary | ICD-10-CM

## 2018-03-12 DIAGNOSIS — I25.2: ICD-10-CM

## 2018-03-12 DIAGNOSIS — J06.9: ICD-10-CM

## 2018-03-12 DIAGNOSIS — E87.6: ICD-10-CM

## 2018-03-12 DIAGNOSIS — Z72.0: ICD-10-CM

## 2018-03-12 DIAGNOSIS — I50.32: ICD-10-CM

## 2018-03-12 DIAGNOSIS — J44.9: ICD-10-CM

## 2018-03-12 DIAGNOSIS — G47.33: ICD-10-CM

## 2018-03-12 DIAGNOSIS — E66.01: ICD-10-CM

## 2018-03-12 DIAGNOSIS — E11.22: ICD-10-CM

## 2018-03-12 DIAGNOSIS — I25.10: ICD-10-CM

## 2018-03-12 LAB
ALBUMIN SERPL BCG-MCNC: 4.1 G/DL (ref 3.4–4.8)
ALP SERPL-CCNC: 63 U/L (ref 40–150)
ALT SERPL W P-5'-P-CCNC: 13 U/L (ref 8–55)
ANION GAP SERPL CALC-SCNC: 18 MMOL/L (ref 10–20)
AST SERPL-CCNC: 12 U/L (ref 5–34)
BASOPHILS # BLD AUTO: 0 THOU/UL (ref 0–0.2)
BASOPHILS NFR BLD AUTO: 0.1 % (ref 0–1)
BILIRUB SERPL-MCNC: 0.6 MG/DL (ref 0.2–1.2)
BUN SERPL-MCNC: 32 MG/DL (ref 8.4–25.7)
CALCIUM SERPL-MCNC: 10.4 MG/DL (ref 7.8–10.44)
CHLORIDE SERPL-SCNC: 94 MMOL/L (ref 98–107)
CK MB SERPL-MCNC: 1.8 NG/ML (ref 0–6.6)
CK SERPL-CCNC: 161 U/L (ref 30–200)
CO2 SERPL-SCNC: 28 MMOL/L (ref 23–31)
CREAT CL PREDICTED SERPL C-G-VRATE: 0 ML/MIN (ref 70–130)
EOSINOPHIL # BLD AUTO: 0.2 THOU/UL (ref 0–0.7)
EOSINOPHIL NFR BLD AUTO: 1.2 % (ref 0–10)
GLOBULIN SER CALC-MCNC: 2.9 G/DL (ref 2.4–3.5)
GLUCOSE SERPL-MCNC: 210 MG/DL (ref 80–115)
HGB BLD-MCNC: 14.8 G/DL (ref 14–18)
LYMPHOCYTES # BLD: 1.7 THOU/UL (ref 1.2–3.4)
LYMPHOCYTES NFR BLD AUTO: 10.8 % (ref 21–51)
MCH RBC QN AUTO: 28 PG (ref 27–31)
MCV RBC AUTO: 86.9 FL (ref 80–94)
MONOCYTES # BLD AUTO: 0.9 THOU/UL (ref 0.11–0.59)
MONOCYTES NFR BLD AUTO: 5.9 % (ref 0–10)
NEUTROPHILS # BLD AUTO: 12.5 THOU/UL (ref 1.4–6.5)
NEUTROPHILS NFR BLD AUTO: 81.9 % (ref 42–75)
PLATELET # BLD AUTO: 231 THOU/UL (ref 130–400)
POTASSIUM SERPL-SCNC: 3 MMOL/L (ref 3.5–5.1)
RBC # BLD AUTO: 5.28 MILL/UL (ref 4.7–6.1)
SODIUM SERPL-SCNC: 137 MMOL/L (ref 136–145)
SP GR UR STRIP: 1.01 (ref 1–1.04)
TROPONIN I SERPL DL<=0.01 NG/ML-MCNC: 0.01 NG/ML (ref ?–0.03)
WBC # BLD AUTO: 15.2 THOU/UL (ref 4.8–10.8)

## 2018-03-12 PROCEDURE — 83735 ASSAY OF MAGNESIUM: CPT

## 2018-03-12 PROCEDURE — 87149 DNA/RNA DIRECT PROBE: CPT

## 2018-03-12 PROCEDURE — 36416 COLLJ CAPILLARY BLOOD SPEC: CPT

## 2018-03-12 PROCEDURE — 87205 SMEAR GRAM STAIN: CPT

## 2018-03-12 PROCEDURE — 83880 ASSAY OF NATRIURETIC PEPTIDE: CPT

## 2018-03-12 PROCEDURE — 87804 INFLUENZA ASSAY W/OPTIC: CPT

## 2018-03-12 PROCEDURE — 85025 COMPLETE CBC W/AUTO DIFF WBC: CPT

## 2018-03-12 PROCEDURE — 82550 ASSAY OF CK (CPK): CPT

## 2018-03-12 PROCEDURE — 81003 URINALYSIS AUTO W/O SCOPE: CPT

## 2018-03-12 PROCEDURE — 83605 ASSAY OF LACTIC ACID: CPT

## 2018-03-12 PROCEDURE — 80048 BASIC METABOLIC PNL TOTAL CA: CPT

## 2018-03-12 PROCEDURE — 84145 PROCALCITONIN (PCT): CPT

## 2018-03-12 PROCEDURE — 85007 BL SMEAR W/DIFF WBC COUNT: CPT

## 2018-03-12 PROCEDURE — A4216 STERILE WATER/SALINE, 10 ML: HCPCS

## 2018-03-12 PROCEDURE — 87040 BLOOD CULTURE FOR BACTERIA: CPT

## 2018-03-12 PROCEDURE — 87070 CULTURE OTHR SPECIMN AEROBIC: CPT

## 2018-03-12 PROCEDURE — 80053 COMPREHEN METABOLIC PANEL: CPT

## 2018-03-12 PROCEDURE — 93005 ELECTROCARDIOGRAM TRACING: CPT

## 2018-03-12 PROCEDURE — 84484 ASSAY OF TROPONIN QUANT: CPT

## 2018-03-12 PROCEDURE — 94640 AIRWAY INHALATION TREATMENT: CPT

## 2018-03-12 PROCEDURE — 96374 THER/PROPH/DIAG INJ IV PUSH: CPT

## 2018-03-12 PROCEDURE — 71045 X-RAY EXAM CHEST 1 VIEW: CPT

## 2018-03-12 PROCEDURE — 36415 COLL VENOUS BLD VENIPUNCTURE: CPT

## 2018-03-12 PROCEDURE — 82553 CREATINE MB FRACTION: CPT

## 2018-03-12 PROCEDURE — 85027 COMPLETE CBC AUTOMATED: CPT

## 2018-03-12 PROCEDURE — 51701 INSERT BLADDER CATHETER: CPT

## 2018-03-12 RX ADMIN — MOMETASONE FUROATE AND FORMOTEROL FUMARATE DIHYDRATE SCH PUFF: 200; 5 AEROSOL RESPIRATORY (INHALATION) at 18:54

## 2018-03-12 RX ADMIN — INSULIN HUMAN PRN UNIT: 100 INJECTION, SOLUTION PARENTERAL at 17:28

## 2018-03-12 RX ADMIN — HEPARIN SODIUM SCH UNITS: 5000 INJECTION, SOLUTION INTRAVENOUS; SUBCUTANEOUS at 21:10

## 2018-03-12 NOTE — RAD
AP VIEW CHEST:

 

Date:  03/12/18 

 

HISTORY:  

Chest pain. 

 

FINDINGS:

 

Comparison made to previous exam of 02/21/18. 

 

AP view of chest demonstrates the lungs to demonstrate some mild pulmonary vascular congestion. Some 
areas of pleural scarring are seen in the left lung base. No significant interval change is seen. No 
acute intrathoracic abnormality is seen. No evidence of effusions seen. 

 

IMPRESSION: 

No evidence of acute intrathoracic abnormality seen. 

 

 

POS: [facility]

## 2018-03-12 NOTE — HP
PRIMARY CARE PHYSICIAN:  Stephan Garcia D.O.

 

CHIEF COMPLAINT:  Fall/weakness.

 

HISTORY OF PRESENT ILLNESS:  The patient is a very pleasant 62-year-old male with significant past me
dical history, who presents to the hospital with complaints of fall and generalized weakness.  The pa
saji states that for the past 3 to 4 days, he has not been feeling well, has been having some upper 
respiratory symptoms including cough with mild sputum production and some congestion.  The patient st
ates that he has not been eating or drinking as much either.  The patient states that he has been alonzo
ping an eye on his weight and has not gained any weight; however, has lost weight.  The patient state
s that this morning, he got up and was trying to get out of the chair to go to the bathroom when his 
legs gave out and he fell.  The patient stated that he did mildly hit his right side of his forehead;
 however, stated that it was just a minor head injury.  The patient denies any fevers or chills.  The
 patient denies any diarrhea, abdominal pain, nausea, or vomiting.  The patient states that he did ha
ve abdominal pain about a week ago, which has resolved.

 

PAST MEDICAL HISTORY:  Significant for:

1.  Chronic diastolic heart failure.

2.  Obstructive sleep apnea, on CPAP.

3.  Morbid obesity.

4.  Diabetes type 2.

5.  Hypertension.

6.  Dyslipidemia.

7.  Coronary artery disease, status post myocardial infarction, stent placement in 2016.

8.  Pulmonary hypertension.

9.  Chronic obstructive pulmonary disease.

10.  Peripheral neuropathy.

 

PAST SURGICAL HISTORY:  Cardiac catheterization, sinus surgery, coronary stent placement in 2016.

 

ALLERGIES:  The patient is allergic to FLOMAX that causes nausea and LEVAQUIN that causes cardiac pro
blems.

 

SOCIAL HISTORY:  The patient is a heavy former smoker, quit in 1997.  Denies any alcohol or drug use.


 

FAMILY HISTORY:  Positive for father having heart disease and mother having stroke.

 

REVIEW OF SYSTEMS:  Except for the ones mentioned in the HPI, a 10-point review of system is negative
:

Constitutional:  Weight loss or gain, ability to conduct usual activities.

Skin:  Rash, itching.

Eyes:  Double vision, pain.

ENT/Mouth:  Nose bleeding, neck stiffness, pain, tenderness.

Cardiovascular:  Palpitations, dyspnea on exertion, orthopnea.

Respiratory:  Shortness of breath, wheezing, cough, hemoptysis, fever or night sweats.

Gastrointestinal:  Poor appetite, abdominal pain, heartburn, nausea, vomiting, constipation, or diarr
hea.

Genitourinary:  Urgency, frequency, dysuria, nocturia.

Musculoskeletal:  Pain, swelling.

Neurologic/Psychiatric:  Anxiety, depression.

Allergy/Immunologic:  Skin rash, bleeding tendency.

 

HOME MEDICATIONS:  This is per the record.  He does not remember all his home medications, they are a
s the following:  Trazodone 100 mg p.o. at bedtime p.r.n., prednisone 20 mg daily, Klonopin 1 mg p.o.
 at bedtime, buspirone 5 mg b.i.d.,  Protonix 40 mg daily, Bystolic 5 mg daily, Singulair 10 mg p.o. 
at bedtime, Levemir 40 units subcu at bedtime, hydrocodone 1 tablet p.o. b.i.d. p.r.n.,  Fluticasone/
salmeterol 2 puffs inhalation q.12 hours, ferrous sulfate 325 daily, Prozac 50 mg daily, clopidogrel 
75 mg daily, atorvastatin 20 mg at bedtime, aspirin 81 mg daily,  tramadol 50 mg p.o. b.i.d. p.r.n., 
glipizide 10 mg p.o. b.i.d., theophylline 1 capsule p.o. b.i.d., spironolactone (Aldactone) 12.5 mg p
.o. q.a.m., potassium chloride 40 mEq t.i.d., MiraLax 17 grams p.o. daily, Cozaar 25 mg b.i.d., DuoNe
b 3 mL nebs q.i.d. p.r.n., gabapentin 300 mg t.i.d., Lasix 40 mg b.i.d., lispro insulin 14 units t.i.
d., Topamax 100 mg p.o. b.i.d. p.r.n., and albuterol 1 puff q.i.d. p.r.n.

 

PHYSICAL EXAMINATION:

VITAL SIGNS:  The patient is afebrile, temperature 98.7, respirations of 20, pulse 95, blood pressure
 is 100/68.  The patient is currently on 3 liters of oxygen, satting 95%.

GENERAL:  The patient is awake, alert, oriented x3, does not appear in acute distress; however, appea
rs to be congested.

CARDIOVASCULAR:  S1, S2 present.  No murmurs, rubs or gallops.  Rate is regular.

RESPIRATORY:  The patient has mild expiratory wheezing:  Diminished breath sounds all over, mild crac
kles in the bilateral lower bases.

ABDOMEN:  Obese.  Bowel sounds are present x2.  No pain upon palpation of the abdomen.

EXTREMITIES:  The patient has +1 lower extremity edema.  He does have a small scab to his left shin. 
 Warm to touch.  Pedal pulses are present.

 

LABORATORY DATA:  WBCs are 15.2, hemoglobin 14.8, hematocrit 45.9, platelets of 231.  Chemistry:  Sod
ium of 137, potassium of 3.0, chloride of 94, bicarbonate of 28, BUN of 32, creatinine of 2.2.  Initi
al lactic was 4 then was reduced to 2, magnesium is 1.8.  LFTs are normal.  Troponin is negative.  BN
P is 23.3.

 

IMAGING:  Chest x-ray on my interpretation, no focal area of possible infiltrate noted.

 

ASSESSMENT AND PLAN:  The patient is a very pleasant 62-year-old male with significant medical histor
y, who presents to the hospital with fall.

1.  Fall secondary to either infectious versus dehydration versus cardiac.  In terms of the infectiou
s process, UA is completely normal.  The patient does have a little bit of which appears to be an upp
er respiratory tract infection, could be mild bronchitis.  We will check a procalcitonin level.  Ches
t x-ray does not show any acute lung processes.  We will check influenza.  Dehydration is definitely 
a possibility.  The patient has been having this congestion for the past 3 to 4 days, has not been ea
ting or drinking and has been taking all his medications which he is on several medications.  The pat
ient was hydrated with 1 liter in the ER.  We will continue to monitor.  We will not hydrate the vero
ent anymore; however, we will just allow the patient to eat a diabetic diet.  In terms of cardiac, tr
oponins are negative.  No significant EKG changes.  The patient denies any chest pain.  BNP is comple
tely normal in the setting of an elevated creatinine.  However, that could be abnormal given the vero
ent's obesity.

2.  Acute kidney injury.  The patient's baseline creatinine of 1.6 to 1.8.  We will continue to monit
or.  This is most likely secondary to dehydration.

3.  Hypokalemia.  We will replace potassium.

4.  Mild elevated lactic acidosis at 4; however, was improved by 2.

5.  History of chronic obstructive pulmonary disease.  We will continue the patient's home medication
.  Continue DuoNebs.  Also, based on the procalcitonin, we may consider antibiotic.

6.  Sleep apnea.  We will start the patient on a CPAP.

## 2018-03-13 LAB
ALBUMIN SERPL BCG-MCNC: 3.8 G/DL (ref 3.4–4.8)
ALP SERPL-CCNC: 56 U/L (ref 40–150)
ALT SERPL W P-5'-P-CCNC: 12 U/L (ref 8–55)
ANION GAP SERPL CALC-SCNC: 16 MMOL/L (ref 10–20)
ANION GAP SERPL CALC-SCNC: 17 MMOL/L (ref 10–20)
ANION GAP SERPL CALC-SCNC: 17 MMOL/L (ref 10–20)
AST SERPL-CCNC: 11 U/L (ref 5–34)
BILIRUB SERPL-MCNC: 0.5 MG/DL (ref 0.2–1.2)
BUN SERPL-MCNC: 33 MG/DL (ref 8.4–25.7)
BUN SERPL-MCNC: 34 MG/DL (ref 8.4–25.7)
BUN SERPL-MCNC: 34 MG/DL (ref 8.4–25.7)
CALCIUM SERPL-MCNC: 9.6 MG/DL (ref 7.8–10.44)
CALCIUM SERPL-MCNC: 9.7 MG/DL (ref 7.8–10.44)
CALCIUM SERPL-MCNC: 9.7 MG/DL (ref 7.8–10.44)
CHLORIDE SERPL-SCNC: 97 MMOL/L (ref 98–107)
CO2 SERPL-SCNC: 29 MMOL/L (ref 23–31)
CO2 SERPL-SCNC: 29 MMOL/L (ref 23–31)
CO2 SERPL-SCNC: 32 MMOL/L (ref 23–31)
CREAT CL PREDICTED SERPL C-G-VRATE: 69 ML/MIN (ref 70–130)
CREAT CL PREDICTED SERPL C-G-VRATE: 72 ML/MIN (ref 70–130)
CREAT CL PREDICTED SERPL C-G-VRATE: 73 ML/MIN (ref 70–130)
GLOBULIN SER CALC-MCNC: 2.7 G/DL (ref 2.4–3.5)
GLUCOSE SERPL-MCNC: 129 MG/DL (ref 80–115)
GLUCOSE SERPL-MCNC: 206 MG/DL (ref 80–115)
GLUCOSE SERPL-MCNC: 318 MG/DL (ref 80–115)
HGB BLD-MCNC: 14 G/DL (ref 14–18)
MCH RBC QN AUTO: 27.8 PG (ref 27–31)
MCV RBC AUTO: 84.9 FL (ref 80–94)
MDIFF COMPLETE?: YES
PLATELET # BLD AUTO: 212 THOU/UL (ref 130–400)
PLATELET BLD QL SMEAR: (no result)
POTASSIUM SERPL-SCNC: 2.5 MMOL/L (ref 3.5–5.1)
POTASSIUM SERPL-SCNC: 2.7 MMOL/L (ref 3.5–5.1)
POTASSIUM SERPL-SCNC: 2.8 MMOL/L (ref 3.5–5.1)
RBC # BLD AUTO: 5.02 MILL/UL (ref 4.7–6.1)
SODIUM SERPL-SCNC: 140 MMOL/L (ref 136–145)
SODIUM SERPL-SCNC: 140 MMOL/L (ref 136–145)
SODIUM SERPL-SCNC: 142 MMOL/L (ref 136–145)
WBC # BLD AUTO: 11.8 THOU/UL (ref 4.8–10.8)

## 2018-03-13 RX ADMIN — HYDROCODONE BITARTRATE AND ACETAMINOPHEN PRN TAB: 5; 325 TABLET ORAL at 23:49

## 2018-03-13 RX ADMIN — HEPARIN SODIUM SCH UNITS: 5000 INJECTION, SOLUTION INTRAVENOUS; SUBCUTANEOUS at 20:26

## 2018-03-13 RX ADMIN — HYDROCODONE BITARTRATE AND ACETAMINOPHEN PRN TAB: 5; 325 TABLET ORAL at 10:44

## 2018-03-13 RX ADMIN — HEPARIN SODIUM SCH UNITS: 5000 INJECTION, SOLUTION INTRAVENOUS; SUBCUTANEOUS at 09:08

## 2018-03-13 RX ADMIN — MOMETASONE FUROATE AND FORMOTEROL FUMARATE DIHYDRATE SCH PUFF: 200; 5 AEROSOL RESPIRATORY (INHALATION) at 06:36

## 2018-03-13 RX ADMIN — INSULIN HUMAN PRN UNIT: 100 INJECTION, SOLUTION PARENTERAL at 13:13

## 2018-03-13 RX ADMIN — INSULIN HUMAN PRN UNIT: 100 INJECTION, SOLUTION PARENTERAL at 16:43

## 2018-03-13 RX ADMIN — MOMETASONE FUROATE AND FORMOTEROL FUMARATE DIHYDRATE SCH PUFF: 200; 5 AEROSOL RESPIRATORY (INHALATION) at 18:51

## 2018-03-13 RX ADMIN — Medication SCH ML: at 22:27

## 2018-03-13 RX ADMIN — HEPARIN SODIUM SCH UNITS: 5000 INJECTION, SOLUTION INTRAVENOUS; SUBCUTANEOUS at 14:19

## 2018-03-13 RX ADMIN — CEFTRIAXONE SCH MLS: 1 INJECTION, POWDER, FOR SOLUTION INTRAMUSCULAR; INTRAVENOUS at 14:15

## 2018-03-13 RX ADMIN — ASPIRIN SCH MG: 81 TABLET ORAL at 09:07

## 2018-03-13 NOTE — PDOC.PN
- Subjective


Encounter Start Date: 03/13/18


Encounter Start Time: 11:00


Subjective: pt up in bed feels much better





- Objective


Resuscitation Status: 


 











Resuscitation Status           FULL:Full Resuscitation














Vital Signs & Weight: 


 Vital Signs (12 hours)











  Temp Pulse Resp BP BP Pulse Ox Pulse Ox


 


 03/13/18 20:00  98.3 F  72  18   170/91 H  92 L 


 


 03/13/18 18:12   72  16    92 L 


 


 03/13/18 16:16  97.8 F  78  20   157/90 H  92 L 


 


 03/13/18 12:26        91 L


 


 03/13/18 12:00  97.6 F  80  22 H   156/92 H  2 L 


 


 03/13/18 10:17     153/81 H    93 L














  Pulse Ox


 


 03/13/18 20:00 


 


 03/13/18 18:12 


 


 03/13/18 16:16 


 


 03/13/18 12:26  93 L


 


 03/13/18 12:00 


 


 03/13/18 10:17 








 Weight











Admit Weight                   313 lb 15.012 oz


 


Weight                         313 lb 15.012 oz














Result Diagrams: 


 03/13/18 04:33





 03/14/18 17:02


Additional Labs: 


 Accuchecks











  03/13/18 03/13/18 03/13/18





  20:26 16:17 11:49


 


POC Glucose  205 H  289 H  259 H














  03/13/18





  05:17


 


POC Glucose  112 H














Phys Exam





- Physical Examination


HEENT: PERRLA


Neck: no nodes, no JVD


Respiratory: no wheezing, no rales


mild rhonchi to upper lungs


Cardiovascular: RRR, no significant murmur


Gastrointestinal: soft, non-tender


Musculoskeletal: no edema


Neurological: non-focal





Dx/Plan





- Plan





1) generalized weakness


2) fall mechanical


3) copd on home oxygen


4) URI 


5) dehydration


6) belkys


7) hypokalemia





plan: pt's weakness could be secondary to dehydration. pt's diuretics held will 

monitor. pt on abx, will continue duonebs for now. will monitor belkys most likley 

prerenal. will replace K.


* .








Review of Systems





- Review of Systems


Eyes: negative: Pain, Vision Change, Conjunctivae Inflammation, Eyelid 

Inflammation, Redness, Other


ENT: negative: Ear Pain, Ear Discharge, Nose Pain, Nose Discharge, Nose 

Congestion, Mouth Pain, Mouth Swelling, Throat Pain, Throat Swelling, Other


Respiratory: negative: Cough, Dry, Shortness of Breath, Hemoptysis, SOB with 

Excertion, Pleuritic Pain, Sputum, Wheezing


Cardiovascular: negative: chest pain, palpitations, orthopnea, paroxysmal 

nocturnal dyspnea, edema, light headedness, other





- Medications/Allergies


Allergies/Adverse Reactions: 


 Allergies











Allergy/AdvReac Type Severity Reaction Status Date / Time


 


levofloxacin [From Levaquin] Allergy   Verified 01/31/18 23:55


 


tamsulosin [From Flomax] Allergy   Verified 01/31/18 23:55











Medications: 


 Current Medications





Acetaminophen (Tylenol)  650 mg PO Q6H PRN


   PRN Reason: Pain


   Last Admin: 03/14/18 16:00 Dose:  650 mg


Hydrocodone Bitart/Acetaminophen (Norco 5/325)  1 tab PO BID PRN


   PRN Reason: Pain


   Last Admin: 03/14/18 21:31 Dose:  1 tab


Albuterol/Ipratropium (Duoneb)  3 ml NEB [id number] [facility]


   Last Admin: 03/14/18 18:48 Dose:  3 ml


Aspirin (Ecotrin)  81 mg PO DAILY [facility]


   Last Admin: 03/14/18 08:41 Dose:  81 mg


Atorvastatin Calcium (Lipitor)  20 mg PO HS [facility]


   Last Admin: 03/14/18 20:06 Dose:  20 mg


Buspirone HCl (Buspar)  5 mg PO BID [facility]


   Last Admin: 03/14/18 20:06 Dose:  5 mg


Clonazepam (Klonopin)  1 mg PO HS [facility]


   Last Admin: 03/14/18 20:07 Dose:  1 mg


Clopidogrel Bisulfate (Plavix)  75 mg PO DAILY [facility]


   Last Admin: 03/14/18 08:41 Dose:  75 mg


Dextrose/Water (Dextrose 50%)  25 gm SLOW IVP PRN PRN


   PRN Reason: Hypoglycemia


Docusate Sodium (Colace)  100 mg PO BID [facility]


   Last Admin: 03/14/18 20:07 Dose:  Not Given


Doxycycline Hyclate (Vibramycin)  100 mg PO BID [facility]


   Last Admin: 03/14/18 20:06 Dose:  100 mg


Ferrous Sulfate (Feosol)  325 mg PO QA-[facility]


   Last Admin: 03/14/18 08:41 Dose:  325 mg


Fluoxetine HCl (Prozac)  50 mg PO DAILY [facility]


   Last Admin: 03/14/18 11:17 Dose:  50 mg


Furosemide (Lasix)  40 mg PO BID [facility]


   Last Admin: 03/14/18 20:08 Dose:  40 mg


Glucagon (Glucagon)  1 mg IM PRN PRN


   PRN Reason: Hypoglycemia


Guaifenesin (Mucinex)  600 mg PO Q12HR [facility]


   Last Admin: 03/14/18 20:06 Dose:  600 mg


Heparin Sodium (Porcine) (Heparin)  5,000 units SC TID [facility]


   Last Admin: 03/14/18 20:08 Dose:  Not Given


Dextrose/Water (D5w)  1,000 mls @ 0 mls/hr IV .Q0M PRN; As Directed


   PRN Reason: Hypoglycemia


Insulin Detemir 42 units/ (Miscellaneous Medication)  0.42 mls @ 0 mls/hr SC [facility]


   Last Admin: 03/14/18 20:08 Dose:  0.42 mls


Ceftriaxone Sodium 1 gm/ (Syringe 0.4 ml/ Sterile Water)  10 mls @ 120 mls/hr 

SLOW IVP 1500 [facility]


   Last Admin: 03/14/18 15:26 Dose:  10 mls


Insulin Human Regular (Humulin R)  0 units SC .MILD SLIDING SCALE PRN


   PRN Reason: Mild Correctional Scale


   Last Admin: 03/14/18 20:54 Dose:  3 unit


Mometasone Furoate/Formoterol Fumar (Dulera 200 Mcg/5 Mcg Inhaler)  2 puff INH 

BID-RT [facility]


   Last Admin: 03/14/18 18:50 Dose:  2 puff


Montelukast Sodium (Singulair)  10 mg PO [facility]


   Last Admin: 03/14/18 20:06 Dose:  10 mg


Nebivolol (Bystolic)  5 mg PO DAILY [facility]


   Last Admin: 03/14/18 08:41 Dose:  5 mg


Pantoprazole Sodium (Protonix)  40 mg PO 2100 [facility]


   Last Admin: 03/14/18 20:07 Dose:  40 mg


Potassium Chloride (Klor-Con 10)  40 meq PO TID [facility]


   Last Admin: 03/14/18 20:08 Dose:  40 meq


Prednisone (Prednisone)  20 mg PO QAM-WM [facility]


   Last Admin: 03/14/18 08:41 Dose:  20 mg


Sodium Chloride (Flush - Normal Saline)  10 ml IVF Q12HR [facility]


   Last Admin: 03/14/18 20:09 Dose:  10 ml


Sodium Chloride (Flush - Normal Saline)  10 ml IVF PRN PRN


   PRN Reason: Saline Flush


Trazodone HCl (Desyrel)  100 mg PO HS PRN


   PRN Reason: Insomnia


Zolpidem Tartrate (Ambien)  5 mg PO HSPRN PRN


   PRN Reason: Insomnia


   Last Admin: 03/14/18 21:31 Dose:  5 mg

## 2018-03-14 LAB
ANION GAP SERPL CALC-SCNC: 18 MMOL/L (ref 10–20)
BUN SERPL-MCNC: 29 MG/DL (ref 8.4–25.7)
CALCIUM SERPL-MCNC: 9.6 MG/DL (ref 7.8–10.44)
CHLORIDE SERPL-SCNC: 100 MMOL/L (ref 98–107)
CO2 SERPL-SCNC: 27 MMOL/L (ref 23–31)
CREAT CL PREDICTED SERPL C-G-VRATE: 82 ML/MIN (ref 70–130)
GLUCOSE SERPL-MCNC: 108 MG/DL (ref 80–115)
POTASSIUM SERPL-SCNC: 2.6 MMOL/L (ref 3.5–5.1)
POTASSIUM SERPL-SCNC: 3.2 MMOL/L (ref 3.5–5.1)
SODIUM SERPL-SCNC: 142 MMOL/L (ref 136–145)

## 2018-03-14 RX ADMIN — Medication SCH ML: at 20:09

## 2018-03-14 RX ADMIN — CEFTRIAXONE SCH MLS: 1 INJECTION, POWDER, FOR SOLUTION INTRAMUSCULAR; INTRAVENOUS at 15:26

## 2018-03-14 RX ADMIN — HYDROCODONE BITARTRATE AND ACETAMINOPHEN PRN TAB: 5; 325 TABLET ORAL at 08:40

## 2018-03-14 RX ADMIN — Medication SCH ML: at 08:49

## 2018-03-14 RX ADMIN — INSULIN HUMAN PRN UNIT: 100 INJECTION, SOLUTION PARENTERAL at 20:54

## 2018-03-14 RX ADMIN — HYDROCODONE BITARTRATE AND ACETAMINOPHEN PRN TAB: 5; 325 TABLET ORAL at 21:31

## 2018-03-14 RX ADMIN — HEPARIN SODIUM SCH: 5000 INJECTION, SOLUTION INTRAVENOUS; SUBCUTANEOUS at 20:08

## 2018-03-14 RX ADMIN — HEPARIN SODIUM SCH: 5000 INJECTION, SOLUTION INTRAVENOUS; SUBCUTANEOUS at 16:02

## 2018-03-14 RX ADMIN — MOMETASONE FUROATE AND FORMOTEROL FUMARATE DIHYDRATE SCH PUFF: 200; 5 AEROSOL RESPIRATORY (INHALATION) at 08:51

## 2018-03-14 RX ADMIN — ASPIRIN SCH MG: 81 TABLET ORAL at 08:41

## 2018-03-14 RX ADMIN — INSULIN HUMAN PRN UNIT: 100 INJECTION, SOLUTION PARENTERAL at 18:24

## 2018-03-14 RX ADMIN — HEPARIN SODIUM SCH UNITS: 5000 INJECTION, SOLUTION INTRAVENOUS; SUBCUTANEOUS at 08:44

## 2018-03-14 RX ADMIN — MOMETASONE FUROATE AND FORMOTEROL FUMARATE DIHYDRATE SCH PUFF: 200; 5 AEROSOL RESPIRATORY (INHALATION) at 18:50

## 2018-03-14 NOTE — PDOC.PN
- Subjective


Encounter Start Date: 03/14/18


Encounter Start Time: 13:00


Subjective: pt up in  bed states he has not slept much last night





- Objective


Resuscitation Status: 


 











Resuscitation Status           FULL:Full Resuscitation














Vital Signs & Weight: 


 Vital Signs (12 hours)











  Temp Pulse Resp BP Pulse Ox


 


 03/14/18 20:13  98.0 F  74  22 H  199/102 H  95


 


 03/14/18 20:00  98.0 F  74  22 H   95


 


 03/14/18 18:50   67  20   97


 


 03/14/18 18:48   67  20   96


 


 03/14/18 16:00  97.8 F  71  22 H  122/79  95


 


 03/14/18 12:12  98.0 F  77  22 H  174/78 H  94 L


 


 03/14/18 10:54   88  24 H  








 Weight











Admit Weight                   313 lb 15.012 oz


 


Weight                         313 lb 15.012 oz














I&O: 


 











 03/13/18 03/14/18 03/15/18





 06:59 06:59 06:59


 


Intake Total  530 


 


Balance  530 











Result Diagrams: 


 03/13/18 04:33





 03/14/18 17:02


Additional Labs: 


 Accuchecks











  03/14/18 03/14/18 03/14/18





  20:54 16:11 11:48


 


POC Glucose  250 H  281 H  173 H














  03/14/18





  05:34


 


POC Glucose  103














Phys Exam





- Physical Examination


Neck: no nodes


Respiratory: wheezing present


Cardiovascular: RRR, no significant murmur


Gastrointestinal: soft, non-tender


Musculoskeletal: edema present


lower ext


Neurological: non-focal


Lymphatic: no nodes





Dx/Plan





- Plan





* .


1) generalized weakness


2) fall mechanical


3) copd on home oxygen


4) URI 


5) dehydration


6) belkys


7) hypokalemia





plan: pt's weakness could be secondary to dehydration. pt's diuretics held will 

monitor. pt on abx, will continue duonebs for now. will monitor belkys most likley 

prerenal. will replace K. pt on home steroids, will add one additional dose of 

steroids iv. pt's k is still low will replace. 


* .





Review of Systems





- Review of Systems


Eyes: negative: Pain, Vision Change, Conjunctivae Inflammation, Eyelid 

Inflammation, Redness, Other


ENT: negative: Ear Pain, Ear Discharge, Nose Pain, Nose Discharge, Nose 

Congestion, Mouth Pain, Mouth Swelling, Throat Pain, Throat Swelling, Other


Respiratory: negative: Cough, Dry, Shortness of Breath, Hemoptysis, SOB with 

Excertion, Pleuritic Pain, Sputum, Wheezing


Cardiovascular: negative: chest pain, palpitations, orthopnea, paroxysmal 

nocturnal dyspnea, edema, light headedness, other





- Medications/Allergies


Allergies/Adverse Reactions: 


 Allergies











Allergy/AdvReac Type Severity Reaction Status Date / Time


 


levofloxacin [From Levaquin] Allergy   Verified 01/31/18 23:55


 


tamsulosin [From Flomax] Allergy   Verified 01/31/18 23:55











Medications: 


 Current Medications





Acetaminophen (Tylenol)  650 mg PO Q6H PRN


   PRN Reason: Pain


   Last Admin: 03/14/18 16:00 Dose:  650 mg


Hydrocodone Bitart/Acetaminophen (Norco 5/325)  1 tab PO BID PRN


   PRN Reason: Pain


   Last Admin: 03/14/18 21:31 Dose:  1 tab


Albuterol/Ipratropium (Duoneb)  3 ml NEB [id number] [facility]


   Last Admin: 03/14/18 18:48 Dose:  3 ml


Aspirin (Ecotrin)  81 mg PO DAILY [facility]


   Last Admin: 03/14/18 08:41 Dose:  81 mg


Atorvastatin Calcium (Lipitor)  20 mg PO HS [facility]


   Last Admin: 03/14/18 20:06 Dose:  20 mg


Buspirone HCl (Buspar)  5 mg PO BID [facility]


   Last Admin: 03/14/18 20:06 Dose:  5 mg


Clonazepam (Klonopin)  1 mg PO HS [facility]


   Last Admin: 03/14/18 20:07 Dose:  1 mg


Clopidogrel Bisulfate (Plavix)  75 mg PO DAILY [facility]


   Last Admin: 03/14/18 08:41 Dose:  75 mg


Dextrose/Water (Dextrose 50%)  25 gm SLOW IVP PRN PRN


   PRN Reason: Hypoglycemia


Docusate Sodium (Colace)  100 mg PO BID [facility]


   Last Admin: 03/14/18 20:07 Dose:  Not Given


Doxycycline Hyclate (Vibramycin)  100 mg PO BID [facility]


   Last Admin: 03/14/18 20:06 Dose:  100 mg


Ferrous Sulfate (Feosol)  325 mg PO QA-[facility]


   Last Admin: 03/14/18 08:41 Dose:  325 mg


Fluoxetine HCl (Prozac)  50 mg PO DAILY [facility]


   Last Admin: 03/14/18 11:17 Dose:  50 mg


Furosemide (Lasix)  40 mg PO BID [facility]


   Last Admin: 03/14/18 20:08 Dose:  40 mg


Glucagon (Glucagon)  1 mg IM PRN PRN


   PRN Reason: Hypoglycemia


Guaifenesin (Mucinex)  600 mg PO Q12HR [facility]


   Last Admin: 03/14/18 20:06 Dose:  600 mg


Heparin Sodium (Porcine) (Heparin)  5,000 units SC TID [facility]


   Last Admin: 03/14/18 20:08 Dose:  Not Given


Dextrose/Water (D5w)  1,000 mls @ 0 mls/hr IV .Q0M PRN; As Directed


   PRN Reason: Hypoglycemia


Insulin Detemir 42 units/ (Miscellaneous Medication)  0.42 mls @ 0 mls/hr SC [facility]


   Last Admin: 03/14/18 20:08 Dose:  0.42 mls


Ceftriaxone Sodium 1 gm/ (Syringe 0.4 ml/ Sterile Water)  10 mls @ 120 mls/hr 

SLOW IVP 1500 [facility]


   Last Admin: 03/14/18 15:26 Dose:  10 mls


Insulin Human Regular (Humulin R)  0 units SC .MILD SLIDING SCALE PRN


   PRN Reason: Mild Correctional Scale


   Last Admin: 03/14/18 20:54 Dose:  3 unit


Mometasone Furoate/Formoterol Fumar (Dulera 200 Mcg/5 Mcg Inhaler)  2 puff INH 

BID-RT [facility]


   Last Admin: 03/14/18 18:50 Dose:  2 puff


Montelukast Sodium (Singulair)  10 mg PO HS [facility]


   Last Admin: 03/14/18 20:06 Dose:  10 mg


Nebivolol (Bystolic)  5 mg PO DAILY [facility]


   Last Admin: 03/14/18 08:41 Dose:  5 mg


Pantoprazole Sodium (Protonix)  40 mg PO 2100 [facility]


   Last Admin: 03/14/18 20:07 Dose:  40 mg


Potassium Chloride (Klor-Con 10)  40 meq PO TID [facility]


   Last Admin: 03/14/18 20:08 Dose:  40 meq


Prednisone (Prednisone)  20 mg PO QAM-WM [facility]


   Last Admin: 03/14/18 08:41 Dose:  20 mg


Sodium Chloride (Flush - Normal Saline)  10 ml IVF Q12HR [facility]


   Last Admin: 03/14/18 20:09 Dose:  10 ml


Sodium Chloride (Flush - Normal Saline)  10 ml IVF PRN PRN


   PRN Reason: Saline Flush


Trazodone HCl (Desyrel)  100 mg PO HS PRN


   PRN Reason: Insomnia


Zolpidem Tartrate (Ambien)  5 mg PO HSPRN PRN


   PRN Reason: Insomnia


   Last Admin: 03/14/18 21:31 Dose:  5 mg

## 2018-03-15 VITALS — DIASTOLIC BLOOD PRESSURE: 98 MMHG | SYSTOLIC BLOOD PRESSURE: 155 MMHG

## 2018-03-15 VITALS — TEMPERATURE: 97.9 F

## 2018-03-15 LAB
ANION GAP SERPL CALC-SCNC: 18 MMOL/L (ref 10–20)
BASOPHILS # BLD AUTO: 0 THOU/UL (ref 0–0.2)
BASOPHILS NFR BLD AUTO: 0 % (ref 0–1)
BUN SERPL-MCNC: 31 MG/DL (ref 8.4–25.7)
CALCIUM SERPL-MCNC: 9.2 MG/DL (ref 7.8–10.44)
CHLORIDE SERPL-SCNC: 101 MMOL/L (ref 98–107)
CO2 SERPL-SCNC: 24 MMOL/L (ref 23–31)
CREAT CL PREDICTED SERPL C-G-VRATE: 81 ML/MIN (ref 70–130)
EOSINOPHIL # BLD AUTO: 0 THOU/UL (ref 0–0.7)
EOSINOPHIL NFR BLD AUTO: 0.3 % (ref 0–10)
GLUCOSE SERPL-MCNC: 222 MG/DL (ref 80–115)
HGB BLD-MCNC: 14.5 G/DL (ref 14–18)
HGB BLD-MCNC: 14.7 G/DL (ref 14–18)
LYMPHOCYTES # BLD: 1.4 THOU/UL (ref 1.2–3.4)
LYMPHOCYTES NFR BLD AUTO: 15.5 % (ref 21–51)
MAGNESIUM SERPL-MCNC: 2.1 MG/DL (ref 1.6–2.6)
MCH RBC QN AUTO: 27.5 PG (ref 27–31)
MCH RBC QN AUTO: 27.9 PG (ref 27–31)
MCV RBC AUTO: 84.6 FL (ref 80–94)
MCV RBC AUTO: 84.7 FL (ref 80–94)
MDIFF COMPLETE?: YES
MONOCYTES # BLD AUTO: 0.7 THOU/UL (ref 0.11–0.59)
MONOCYTES NFR BLD AUTO: 7.3 % (ref 0–10)
NEUTROPHILS # BLD AUTO: 7 THOU/UL (ref 1.4–6.5)
NEUTROPHILS NFR BLD AUTO: 77 % (ref 42–75)
PLATELET # BLD AUTO: 232 THOU/UL (ref 130–400)
PLATELET # BLD AUTO: 234 THOU/UL (ref 130–400)
POTASSIUM SERPL-SCNC: 3 MMOL/L (ref 3.5–5.1)
RBC # BLD AUTO: 5.27 MILL/UL (ref 4.7–6.1)
RBC # BLD AUTO: 5.28 MILL/UL (ref 4.7–6.1)
SODIUM SERPL-SCNC: 140 MMOL/L (ref 136–145)
WBC # BLD AUTO: 9.1 THOU/UL (ref 4.8–10.8)
WBC # BLD AUTO: 9.2 THOU/UL (ref 4.8–10.8)

## 2018-03-15 RX ADMIN — HYDROCODONE BITARTRATE AND ACETAMINOPHEN PRN TAB: 5; 325 TABLET ORAL at 09:05

## 2018-03-15 RX ADMIN — CEFTRIAXONE SCH MLS: 1 INJECTION, POWDER, FOR SOLUTION INTRAMUSCULAR; INTRAVENOUS at 14:00

## 2018-03-15 RX ADMIN — HEPARIN SODIUM SCH: 5000 INJECTION, SOLUTION INTRAVENOUS; SUBCUTANEOUS at 14:02

## 2018-03-15 RX ADMIN — HEPARIN SODIUM SCH UNITS: 5000 INJECTION, SOLUTION INTRAVENOUS; SUBCUTANEOUS at 09:09

## 2018-03-15 RX ADMIN — Medication SCH ML: at 09:09

## 2018-03-15 RX ADMIN — MOMETASONE FUROATE AND FORMOTEROL FUMARATE DIHYDRATE SCH PUFF: 200; 5 AEROSOL RESPIRATORY (INHALATION) at 09:26

## 2018-03-15 RX ADMIN — ASPIRIN SCH MG: 81 TABLET ORAL at 09:07

## 2018-03-15 NOTE — DIS
DATE OF ADMISSION:  03/12/2018

 

DATE OF DISCHARGE:  03/15/2018

 

DISCHARGE DIAGNOSES:

1.  Generalized weakness.

2.  Possible upper respiratory tract infection.

3.  Acute kidney injury.

4.  Hypokalemia.

 

HISTORY OF PRESENT ILLNESS:  The patient is a very pleasant 62-year-old male who initially presented 
to the hospital with generalized weakness.  The patient at that time was found to have acute kidney i
njury and also possible signs of upper respiratory tract infection.  The patient was given some gentl
e hydration.  Creatinine continued to improve throughout the hospital stay.  The patient does have a 
history of chronic kidney disease.  This was acute on chronic kidney disease.  The patient's creatini
ne on discharge was 1.9.  This hospital stay was complicated with hypokalemia.  Patient normally take
s 40 mEq of potassium 3 times a day.  The patient was given significant amount of potassium and potas
sium was 3.0 on discharge.  Patient was advised to continue taking his home potassium.  He was also a
sked to eat a banana daily.  Also, he will be given a slip to check his BMP next week.  Patient clement beltran was started on some IV antibiotics, which was changed to oral antibiotics for possible upper res
piratory tract infection.

 

DISCHARGE MEDICATIONS:  Are the following:  Trazodone 100 mg p.o. at bedtime, prednisone 20 mg daily,
 Klonopin 1 p.o. at bedtime, Protonix 40 mg daily, Bystolic 5 mg daily, Tussionex 100 mg p.o. t.i.d. 
p.r.n., atorvastatin 20 mg at bedtime, Lasix 40 mg p.o. b.i.d.,  Levemir 45 units at bedtime, ferrous
 sulfate 325 p.o. daily, tramadol 50 b.i.d. p.r.n., glipizide 10 mg p.o. b.i.d., Singulair 10 mg p.o.
 at bedtime, DuoNebs q.i.d. p.r.n., Neurontin 300 mg p.o. t.i.d.,  Plavix 75 mg p.o. daily, Humalog 4
0 units t.i.d., Advair two puffs q.12 h., aspirin 81 mg daily, buspirone 5 mg p.o. b.i.d., potassium 
chloride 40 mEq p.o. t.i.d., fluoxetine 50 mg p.o. daily, theophylline 1 cap p.o. b.i.d.  spironolact
one 12.5 mg p.o. q.a.m., MiraLax 17 grams p.o. daily, Mucinex 600 mg q.12 h., this is new, only for 3
 days and doxycycline 100 mg p.o. b.i.d. for 3 days also.  The patient upon discharge was able to up 
and ambulate and states he feels a lot better.  He will continue to take his CPAP at night.  He is on
 chronic 2 liters of oxygen.  Patient will follow up with his PCP in 1 week and also with Nephrology.

## 2018-03-15 NOTE — PDOC.PN
- Subjective


Encounter Start Date: 03/15/18


Encounter Start Time: 11:30


Subjective: pt up in bed no complains





- Objective


Resuscitation Status: 


 











Resuscitation Status           FULL:Full Resuscitation














Vital Signs & Weight: 


 Vital Signs (12 hours)











  Temp Pulse Resp BP Pulse Ox


 


 03/15/18 09:26   71  18   95


 


 03/15/18 09:24   71  18   95


 


 03/15/18 08:00  97.9 F  71  18  155/98 H  94 L


 


 03/15/18 05:53  97.9 F  64  20  169/74 H  98








 Weight











Admit Weight                   313 lb 15.012 oz


 


Weight                         313 lb 15.012 oz














I&O: 


 











 03/14/18 03/15/18 03/16/18





 06:59 06:59 06:59


 


Intake Total 530  


 


Balance 530  











Result Diagrams: 


 03/15/18 04:08





 03/15/18 04:08


Additional Labs: 


 Accuchecks











  03/15/18 03/14/18 03/14/18





  10:52 20:54 16:11


 


POC Glucose  144 H  250 H  281 H














Phys Exam





- Physical Examination


HEENT: PERRLA


Neck: no nodes, no JVD


Respiratory: no wheezing, no rales


Cardiovascular: RRR, no significant murmur


Gastrointestinal: soft, non-tender


Musculoskeletal: no edema





Dx/Plan





- Plan





* .


1) generalized weakness


2) fall mechanical


3) copd on home oxygen


4) URI 


5) dehydration


6) belkys


7) hypokalemia





plan: pt's weakness could be secondary to dehydration. pt's diuretics held will 

monitor. pt on abx, will continue duonebs for now. will monitor belkys most likley 

prerenal. will replace K. pt on home steroids, will add one additional dose of 

steroids iv. pt's k is still low will replace. 





will discharge pt home today

## 2018-05-07 NOTE — CON
DATE OF CONSULTATION:  05/07/2018

 

CONSULTING PHYSICIAN:  Dr. Hayes from the emergency room.

 

REASON FOR CONSULTATION:  Pneumonia with acute respiratory failure.

 

HISTORY OF PRESENT ILLNESS:  Mr. Moya is a 63-year-old, who is well known to 
me from previous visits to the office related to chronic obstructive pulmonary 
disease and sleep apnea.  He routinely follows with Dr. Garcia as an outpatient, 
but was previously seeing Dr. Zapata before he retired.

 

The patient came to the hospital today after waking up at 4:30 this morning 
extremely short of breath.  Apparently, had a temperature just above 100.  He 
has been coughing.  He has not been wheezing.  He has not had any chest pain.  
He says he has been faithfully taking his medications at home.

 

PAST MEDICAL HISTORY:

1.  Severe chronic obstructive pulmonary disease, requiring chronic prednisone 
and chronic home oxygen.

2.  Nasal polyps.

3.  Coronary artery disease.

4.  Severe LEESA.

5.  Morbid obesity.

6.  Diabetes mellitus type 2.

7.  Chronic back pain.

8.  Hypertension.

 

PAST SURGICAL HISTORY:

1.  He has had several nasal polypectomies.

2.  Cardiac catheterization with PTCA and stent.

 

ALLERGIES:  None.

 

SOCIAL HISTORY:  Quit smoking about 16 years ago.  Lives at home with his wife.
  Does not use alcohol.

 

MEDICATIONS PRIOR TO ADMISSION:  Based on his last discharge medications 2 
months ago:  He is taking trazodone 100 mg nightly; prednisone 20 mg daily; 
Klonopin 1 mg nightly; Protonix 40 mg daily; Bystolic 5 mg daily; Tessalon 
perles 100 mg t.i.d. as needed; atorvastatin 20 mg daily; Lasix 40 mg twice 
daily; Levemir insulin 45 units at night; iron sulfate 325 mg daily; tramadol 
50 mg b.i.d. as needed; glipizide 10 mg b.i.d.; Singulair 10 mg at night; 
DuoNebs 4 times daily; Neurontin 300 mg t.i.d.; Plavix 75 mg daily; Humalog 
insulin 40 units t.i.d.; Advair 250/50 one puff twice daily; aspirin 81 mg daily
; buspirone 5 mg b.i.d.; potassium chloride 40 mEq daily; fluoxetine 50 mg daily
; theophylline-24, unknown dose b.i.d.; spironolactone 12.5 mg daily; MiraLax 
17 grams daily; Mucinex 600 mg b.i.d.

 

REVIEW OF SYSTEMS:  Remarkable for obesity, chronic shortness of breath, 
chronic sputum production.  Otherwise, 10-point review of systems is negative.

 

PHYSICAL EXAMINATION:

VITAL SIGNS:  Temperature 100.1, pulse 85, blood pressure 147/85, respiratory 
rate 18, O2 sat 100% on 3 liters.

GENERAL:  He is awake, alert, appears to be comfortable.  He is a morbidly 
obese male, who is in no acute distress.

HEENT:  Pupils react.  Sclerae anicteric.  Oropharynx clear.

NECK:  No adenopathy or JVD.

LUNGS:  Diminished breath sounds without active wheezing or rhonchi.

CARDIOVASCULAR:  S1, S2 regular without murmur.

ABDOMEN:  Soft, obese, nontender, and nondistended.

EXTREMITIES:  No clubbing or cyanosis.  Has 1+ edema from knees downward.

 

LABORATORY DATA:  White blood cell count 10.5, hematocrit 41.8, platelet count 
138.  PH of 7.44, pCO2 of 34, pO2 of 92 that was on BiPAP, total rate 28%.  
Sodium 138, potassium 4.4, chloride 102, CO2 of 23, BUN 22, creatinine 1.7, 
glucose 192.  Troponin 0.016.  BNP 92.2.

 

IMAGING:  Chest x-ray reviewed personally by myself shows bilateral 
interstitial changes, pneumonitis versus pulmonary edema.

 

ASSESSMENT:

1.  Febrile illness -- bronchitis versus pneumonia.

2.  History of chronic obstructive pulmonary disease -- actually, he is well 
compensated at the current time.

3.  Coronary artery disease.

4.  Morbid obesity.

5.  Renal dysfunction.

 

RECOMMENDATIONS:

1.  I would treat the patient with broad-spectrum antibiotics.  I would also 
treat him with steroids.  I have been able to successfully wean him off the 
BiPAP in the ER.  He needs to continue wear his home CPAP at night.

2.  I will be happy to follow with you.



70min time spent on consult.  Of that time >50% was spent with patient and/or 
on pts unit

 

MTDD

## 2018-05-07 NOTE — RAD
CHEST 1 VIEW:

 

Date:  05/07/18 

 

HISTORY:  

Difficulty breathing. Pneumonia. COPD. CHF. 

 

COMPARISON:  

03/12/18. 

 

FINDINGS:

Portable upright chest demonstrates a normal cardiac silhouette. Pulmonary vessels are slightly promi
nent. Patchy interstitial opacities, without consolidation or mass. No pneumothorax or osseous abnorm
alities. 

 

IMPRESSION: 

Patchy pulmonary vascular prominence. Correlate for volume overload. 

 

 

POS: [facility]

## 2018-05-07 NOTE — HP
DATE OF ADMISSION:  05/07/2018

 

PRIMARY CARE PHYSICIAN:  Stephan Garcia D.O.

 

CHIEF COMPLAINT:  Worsening shortness of breath.

 

HISTORY OF PRESENT ILLNESS:  Mr. Moya is a 63-year-old male with past medical history of chronic treasure
stolic congestive heart failure as well as severe obstructive sleep apnea, on CPAP; coronary artery d
isease; and morbid obesity, who presented to the emergency room with the above-mentioned complaint.  
History is mainly obtained by the patient himself.  Electronic medical records have been reviewed and
 the case has been discussed with the admitting ER physician.

 

He was recently admitted to our facility from 03/12/2018 to 03/15/2018 and was treated for acute hugo
l insufficiency, possible upper respiratory tract infection, hypokalemia, and generalized weakness.

 

The patient reports that he has been compliant with his medication and has been feeling fairly well u
p until earlier this morning.  When he woke up in the early hours of morning today, he suddenly felt 
that he could not breathe.  He is using his CPAP as prescribed and denies any recent illnesses.  He d
enies any cough, but has noticed a low-grade temperature earlier this morning around 100 degrees.  He
 denies any recent sick contacts.  He did not take any nebulizers at home, but tried inhalers without
 any relief.  He denies any chest pain, left arm paraesthesias or any wheezing.

 

Upon presentation to the emergency room, he was tachycardic to heart rate 111 and was in acute respir
atory distress.  He was started on BiPAP immediately with significant improvement.  His initial darrell
p included a chest x-ray which showed mild pulmonary vascular congestion, but no infiltrate.  His lab
 work was rather unimpressive with normal WBC count, normal cardiac enzymes and normal BNP.  He was g
iven Lasix x1 dose in the

 

 

In the emergency room, he received 1 dose of Lasix as well as Solu-Medrol.  He was seen by his pulmon
ologist, Dr. Carroll in the emergency room and was weaned off of BiPAP and by the time I saw him he i
s comfortable at a nasal cannula at home oxygen rate.  He is now being admitted to the hospital with 
acute respiratory failure which seems to be multifactorial.

 

PAST MEDICAL HISTORY:

1.  Severe COPD and chronic respiratory failure, on home oxygen.

2.  Obstructive sleep apnea, on CPAP at night.

3.  Chronic diastolic heart failure.

4.  Morbid obesity.

5.  Diabetes mellitus type 2.

6.  Hypertension.

7.  Dyslipidemia.

8.  Coronary artery disease, status post MI and stent placement in 2016.

9.  Pulmonary hypertension.

10.  Peripheral neuropathy.

 

PAST SURGICAL HISTORY:  Cardiac catheterization, sinus surgery, CAD, stent placement in 2016.

 

ALLERGIES:  FLOMAX, which causes nausea and LEVAQUIN that causes cardiac problems.

 

SOCIAL HISTORY:  Quit smoking decades ago.  No history of drug, tobacco or alcohol abuse.  He is tyree
ied and lives with his family.

 

FAMILY HISTORY:  Significant for cardiac disease in father, mother having stroke.

 

REVIEW OF SYSTEMS:  REVIEW OF SYSTEMS:  The following complete review of systems was negative, unless
 otherwise mentioned in the HPI or below:  Constitutional:  Weight loss or gain, ability to conduct u
sual activities.  Skin:  Rash, itching.  Eyes:  Double vision, pain.  ENT/Mouth:  Nose bleeding, neck
 stiffness, pain, tenderness.  Cardiovascular:  Palpitations, dyspnea on exertion, orthopnea.  Respir
atory:  Shortness of breath, wheezing, cough, hemoptysis, fever or night sweats.  Gastrointestinal:  
Poor appetite, abdominal pain, heartburn, nausea, vomiting, constipation, or diarrhea.  Genitourinary
:  Urgency, frequency, dysuria, nocturia.  Musculoskeletal:  Pain, swelling.  Neurologic/Psychiatric:
  Anxiety, depression.  Allergy/Immunologic:  Skin rash, bleeding tendency.

 

Except otherwise mentioned in the HPI, 12 point review of systems is done and is negative.

 

HOME MEDICATIONS:  As per the ER record include cefdinir 300 mg once daily, Bystolic 5 mg daily, Linda
Lax as needed, Aldactone 12.5 mg daily, theophylline 100 mg b.i.d., fluoxetine 50 mg daily, potassium
 chloride daily, buspirone 5 mg b.i.d., Advair daily, Humalog, aspirin, Plavix 75 mg daily, gabapenti
n 300 t.i.d., DuoNebs as needed, montelukast 10 mg daily, Protonix 40 mg daily, glipizide 10 mg daily
, tramadol as needed, clonazepam, on a tapering dose.  His neurologist, Dr. Gonzales is trying to get him
 off of it.  He is supposed to take 1 mg at bedtime, trazodone 100 mg daily, Proventil daily as neede
d, ferrous sulfate 325 mg daily, atorvastatin 20 mg daily, Norco as needed, Levemir is 42 units at be
dtime, prednisone 20 mg daily, Lasix 80 mg b.i.d., Norvasc 10 mg daily, and hydralazine 25 mg b.i.d. 
 These medications further need to be confirmed.

 

LABORATORY DATA:  CBC shows hemoglobin at 15, WBC 10.5, platelet count of 138.  ABG shows pH of 7.4, 
pCO2 of 34, pO2 of 92.  Serum chemistries, creatinine of 1.76 with estimated GFR of 39, blood sugar 1
92.  Otherwise, serum chemistries, cardiac enzymes and BNP are unremarkable.  Theophylline level is l
ow at 8.5.  Chest x-ray by my review has no evidence to suggest any infiltrate, but does suggest a po
ssible pulmonary vascular congestion.

 

PHYSICAL EXAMINATION:

VITAL SIGNS:  Temperature 100.1, pulse of 85, blood pressure 147/85, respirations 18, saturating 100%
 on 3 liters nasal cannula.

GENERAL:  No acute distress.  He does get easily winded with long conversation, but is otherwise awak
e, alert, oriented x3, morbidly obese.

HEENT:  Mucous membrane is moist and pink.  No oropharyngeal exudate or erythema.  Head is normocepha
lic, atraumatic.  Pupils equal, reactive to light and accommodation.  Extraocular movement intact.

NECK:  Obese, soft, nontender, difficult to auscultate because of muscle mass and thickness.

LUNGS:  Diminished breath sounds without any active wheezing, rhonchi, or rales.

CARDIOVASCULAR:  Regular rate and rhythm is regular without any murmur, rubs or gallops.

ABDOMEN:  Morbidly obese, soft, nontender, nondistended, positive bowel sounds.

EXTREMITIES:  Mild pitting edema bilaterally lower extremities in the lower legs, knee or ankle.  No 
cyanosis or clubbing.

NEUROLOGIC:  Nonfocal.

SKIN:  Free of any rashes or bruises.  I feel warm and dry to touch.

PSYCHIATRIC:  Normal affect.

 

IMPRESSION AND PLAN:

1.  Acute respiratory failure.  The patient has been weaned off of BiPAP and is currently stable on o
xygen with nasal cannula.  He will be admitted to telemetry unit.  His presentation seems to be multi
factorial.  He does have a fever and the possibility of infection cannot be ruled out at this time.  
He also seems to be having a chronic obstructive pulmonary disease exacerbation as well as mild fluid
 overload.  He will be treated with empiric antibiotics, IV Lasix as well as IV steroids, nebulizers,
 oxygen, and Dulera while he is in the hospital.  Pulmonary Medicine team has been consulted and Dr. Carroll has already seen the patient.  We appreciate his input.  We will continue his CPAP at night w
naseem in the hospital.

2.  Chronic kidney disease.  Creatinine seems to be at baseline for now.

3.  History of chronic obstructive pulmonary disease and chronic respiratory failure.  Continue home 
medications once confirmed and additional medication, aspirin.

4.  Acute on chronic diastolic congestive heart failure.  Continue IV Lasix for a short course as the
 patient does not appear to be in much fluid overload at this time.

5.  Obstructive sleep apnea, on CPAP.  We will continue this in the hospital.

6.  Hypertension.  Resume home medications, currently well controlled.

7.  Diabetes mellitus type 2.  We will resume his long-acting insulin and start him on insulin slidin
g scale while in the hospital.  He will require higher level of coverage given the fact that he is al
so going to be on IV steroids leading to hyperglycemia.

8.  Coronary artery disease, status post myocardial infarction and stent placement.  We will resume h
ome medications once confirmed.  We will continue with aspirin and Plavix.

9.  Peripheral neuropathy.  Resume gabapentin once the dose is confirmed.

10.  History of chronic low back pain.

11.  Code status:  FULL CODE.  Discussed with the patient and his wife present in the room.

12.  Deep venous thrombosis and gastrointestinal prophylaxis and p.r.n. medications.

 

DISPOSITION:  Mr. Moya is currently being admitted for acute respiratory failure.  Estimated length 
of stay is at least 2-3 midnight.  Further management will depend upon his clinical course.

## 2018-05-08 NOTE — PDOC.PN
- Subjective


Encounter Start Date: 05/08/18


Encounter Start Time: 13:46


Subjective: feels much better today.easier to breathe.


-: no chest pain





- Objective


MAR Reviewed: Yes


Vital Signs & Weight: 


 Vital Signs (12 hours)











  Temp Pulse Resp BP Pulse Ox


 


 05/08/18 11:40   84  18  134/71  89 L


 


 05/08/18 10:54   70  18  


 


 05/08/18 07:22  98.1 F  84  18  124/75  92 L


 


 05/08/18 06:48   90  18  


 


 05/08/18 02:22   85  16   94 L








 Weight











Weight                         328 lb 9.6 oz














I&O: 


 











 05/07/18 05/08/18 05/09/18





 06:59 06:59 06:59


 


Intake Total  658 


 


Output Total  1050 


 


Balance  -392 











Result Diagrams: 


 05/08/18 04:26





 05/08/18 04:26


Additional Labs: 


 Accuchecks


labs reviewed








  05/08/18 05/08/18 05/07/18





  10:59 05:57 17:05


 


POC Glucose  341 H  354 H  394 H








Microbiology





05/07/18 10:04   Venous blood - Left Arm   Blood Culture - Preliminary


                              Specimen has been received and culture in 

progress.


                              No Growth to date.


05/07/18 10:03   Venous blood - Right Arm   Blood Culture - Preliminary


                              Specimen has been received and culture in 

progress.


                              No Growth to date.











Phys Exam





- Physical Examination


Constitutional: NAD


morbidly obese


HEENT: PERRLA, moist MMs, sclera anicteric, oral pharynx no lesions


Neck: no nodes, no JVD, supple, full ROM


Respiratory: no wheezing, no rales, no rhonchi, clear to auscultation bilateral


Cardiovascular: RRR, no significant murmur, no rub


Gastrointestinal: soft, non-tender, no distention, positive bowel sounds


morbid obesity


Musculoskeletal: pulses present, edema present (+1 b/l leg)


Neurological: non-focal, normal sensation, moves all 4 limbs


Psychiatric: normal affect, A&O x 3


Skin: no rash





Dx/Plan


(1) Acute and chronic respiratory failure with hypoxia


Code(s): J96.21 - ACUTE AND CHRONIC RESPIRATORY FAILURE WITH HYPOXIA   Status: 

Acute   Comment: Continue pulmonary support, Duonebs, Brovana,budesonide neb,o2 

support,stroids,empiric ABx   





(2) Diastolic CHF, acute on chronic


Code(s): I50.33 - ACUTE ON CHRONIC DIASTOLIC (CONGESTIVE) HEART FAILURE   Status

: Acute   Comment: Mild.   





(3) COPD exacerbation


Code(s): J44.1 - CHRONIC OBSTRUCTIVE PULMONARY DISEASE W (ACUTE) EXACERBATION   

Status: Chronic   





(4) DM type 2 (diabetes mellitus, type 2)


Status: Chronic   





(5) Dyslipidemia


Code(s): E78.5 - HYPERLIPIDEMIA, UNSPECIFIED   Status: Chronic   





(6) Hypertension


Code(s): I10 - ESSENTIAL (PRIMARY) HYPERTENSION   Status: Chronic   


Qualifiers: 


 





(7) Morbid obesity


Code(s): E66.01 - MORBID (SEVERE) OBESITY DUE TO EXCESS CALORIES   Status: 

Chronic   





(8) Sleep apnea in adult


Code(s): G47.33 - OBSTRUCTIVE SLEEP APNEA (ADULT) (PEDIATRIC)   Status: Chronic

   Comment: Nocturnal CPAP   





(9) Chronic respiratory failure


Code(s): J96.10 - CHRONIC RESPIRATORY FAILURE, UNSP W HYPOXIA OR HYPERCAPNIA   

Status: Acute   





(10) PNA (pneumonia)


Code(s): J18.9 - PNEUMONIA, UNSPECIFIED ORGANISM   Status: Suspected   





- Plan


plan discussed w/ family, PT/OT, respiratory therapy, incentive spirometry, DVT 

proph w/SCDs


clinically better.change to Po steroids & PO ABx.


-: cont nebs,lasix for now.


-: PCCM following.josep DURBIN in am


-: have HH at home.cont home meds as below


-: monitor renal Fx. am labs





* .








Review of Systems





- Review of Systems


Constitutional: negative: fever, chills, sweats, weakness, malaise, other


ENT: negative: Ear Pain, Ear Discharge, Nose Pain, Nose Discharge, Nose 

Congestion, Mouth Pain, Mouth Swelling, Throat Pain, Throat Swelling, Other


Respiratory: SOB with Excertion, Wheezing.  negative: Cough, Dry, Shortness of 

Breath, Hemoptysis, Pleuritic Pain, Sputum


Gastrointestinal: negative: Nausea, Vomiting, Abdominal Pain, Diarrhea, 

Constipation, Melena, Hematochezia, Other


Genitourinary: negative: Dysuria, Frequency, Incontinence, Hematuria, Retention

, Other


Musculoskeletal: negative: Neck Pain, Shoulder Pain, Arm Pain, Back Pain, Hand 

Pain, Leg Pain, Foot Pain, Other


Skin: negative: Rash, Lesions, Jeromy, Bruising, Other


Neurological: negative: Weakness, Numbness, Incoordination, Change in Speech, 

Confusion, Seizures, Other





- Medications/Allergies


Allergies/Adverse Reactions: 


 Allergies











Allergy/AdvReac Type Severity Reaction Status Date / Time


 


levofloxacin [From Levaquin] Allergy   Verified 01/31/18 23:55


 


tamsulosin [From Flomax] Allergy   Verified 01/31/18 23:55











Medications: 


 Current Medications





Acetaminophen (Tylenol)  650 mg PO Q4H PRN


   PRN Reason: Headache/Fever or Pain


Hydrocodone Bitart/Acetaminophen (Norco 5/325)  1 tab PO Q4H PRN


   PRN Reason: Moderate Pain (4-6)


Al Hydroxide/Mg Hydroxide (Maalox)  30 ml PO Q6H PRN


   PRN Reason: Heartburn  or Indigestion


Albuterol/Ipratropium (Duoneb)  3 ml NEB [id number] Former[facility]


   Last Admin: 05/08/18 10:54 Dose:  3 ml


Amlodipine Besylate (Norvasc)  10 mg PO DAILY [facility]


Arformoterol Tartrate (Brovana)  15 mcg NEB BID-RT [facility]


   Last Admin: 05/08/18 06:47 Dose:  15 mcg


Aspirin (Ecotrin)  81 mg PO DAILY [facility]


   Last Admin: 05/08/18 10:56 Dose:  81 mg


Atorvastatin Calcium (Lipitor)  20 mg PO HS [facility]


Benzonatate (Tessalon)  100 mg PO Q4H PRN


   PRN Reason: Cough


Bisacodyl (Dulcolax)  10 mg PO DAILYPRN PRN


   PRN Reason: Constipation


Budesonide (Pulmicort Neb Solution)  0.5 mg INH BID-RT [facility]


   Last Admin: 05/08/18 06:48 Dose:  0.5 mg


Buspirone HCl (Buspar)  5 mg PO BID [facility]


   Last Admin: 05/08/18 09:08 Dose:  5 mg


Calcium Carbonate (Tums)  1,000 mg PO Q4H PRN


   PRN Reason: Heartburn  or Indigestion


   Last Admin: 05/08/18 00:39 Dose:  1,000 mg


Cefdinir (Omnicef)  600 mg PO 2100 [facility]


Clonazepam (Klonopin)  2 mg PO HS [facility]


Clonidine (Catapres)  0.1 mg PO Q4H PRN


   PRN Reason: Systolic BP > 160


Clopidogrel Bisulfate (Plavix)  75 mg PO DAILY [facility]


   Last Admin: 05/08/18 10:56 Dose:  75 mg


Dextrose/Water (Dextrose 50%)  25 gm SLOW IVP PRN PRN


   PRN Reason: Hypoglycemia


Ferrous Sulfate (Feosol)  325 mg PO 0800 [facility]


Fluoxetine HCl (Prozac)  40 mg PO DAILY [facility]


   Last Admin: 05/08/18 09:07 Dose:  40 mg


Furosemide (Lasix)  40 mg SLOW IVP DAILY [facility]


   Last Admin: 05/08/18 09:06 Dose:  40 mg


Gabapentin (Neurontin)  300 mg PO TID [facility]


Glipizide (Glucotrol)  10 mg PO BID-AC [facility]


Glucagon (Glucagon)  1 mg IM PRN PRN


   PRN Reason: Hypoglycemia


Guaifenesin (Mucinex)  1,200 mg PO Q12HR [facility]


   Last Admin: 05/08/18 09:08 Dose:  1,200 mg


Heparin Sodium (Porcine) (Heparin)  5,000 units SC TID [facility]


   Last Admin: 05/08/18 09:06 Dose:  5,000 units


Hydralazine HCl (Apresoline)  10 mg SLOW IVP Q4H PRN


   PRN Reason: Systolic BP > 170


Hydralazine HCl (Apresoline)  25 mg PO BID [facility]


Dextrose/Water (D5w)  1,000 mls @ 0 mls/hr IV .Q0M PRN; As Directed


   PRN Reason: Hypoglycemia


Insulin Detemir 15 units/ (Miscellaneous Medication)  0.15 mls @ 0 mls/hr SC HS 

[facility]


Insulin Detemir 15 units/ (Miscellaneous Medication)  0.15 mls @ 0 mls/hr SC 

QAM [facility]


Insulin Human Lispro (Humalog)  0 units SC .AGGRESSIVE SLIDING  PRN


   PRN Reason: Aggressive Correctional Scale


   Last Admin: 05/08/18 11:56 Dose:  11 unit


Insulin Human Lispro (Humalog)  0 units SC .BEDTIME SLIDING SC PRN


   PRN Reason: Bedtime Correctional Scale


Isosorbide Mononitrate (Imdur Er)  30 mg PO DAILY [facility]


   Last Admin: 05/08/18 10:56 Dose:  30 mg


Loratadine (Claritin)  10 mg PO DAILYPRN PRN


   PRN Reason: Sinus Symptoms


Montelukast Sodium (Singulair)  10 mg PO QPM [facility]


   Last Admin: 05/08/18 00:31 Dose:  10 mg


Nebivolol (Bystolic)  5 mg PO DAILY [facility]


   Last Admin: 05/08/18 10:55 Dose:  5 mg


Nitroglycerin (Nitrostat)  0.4 mg SL Q5MIN PRN


   PRN Reason: Chest Pain


Ondansetron HCl (Zofran)  4 mg IVP Q6H PRN


   PRN Reason: Nausea/Vomiting


Pantoprazole Sodium (Protonix)  40 mg PO DAILY [facility]


   Last Admin: 05/08/18 09:08 Dose:  40 mg


Prednisone (Prednisone)  20 mg PO BID [facility]


   Last Admin: 05/08/18 09:07 Dose:  20 mg


Senna (Senokot)  2 tab PO HSPRN PRN


   PRN Reason: Constipation


Spironolactone (Aldactone)  25 mg PO 0800 [facility]


   Last Admin: 05/08/18 10:56 Dose:  25 mg


Theophylline (Theophylline Sr)  200 mg PO BID [facility]


   Last Admin: 05/08/18 09:06 Dose:  200 mg


Tramadol HCl (Ultram)  50 mg PO Q4H PRN


   PRN Reason: Moderate Pain (4-6)


   Last Admin: 05/08/18 00:38 Dose:  50 mg


Trazodone HCl (Desyrel)  200 mg PO HSPRN PRN


   PRN Reason: Insomnia

## 2018-05-08 NOTE — PDOC.PULPN
Progress Note: Subj/Obj





- Subjective


Date: 05/08/18


Time: 08:32


Narrative: Feels better.  He has no new complaints. Denies any more fever





- ROS


All systems: reviewed and no additional remarkable complaints except as stated





- Objective


Allergies/Adverse Reactions: 


 Allergies











Allergy/AdvReac Type Severity Reaction Status Date / Time


 


levofloxacin [From Levaquin] Allergy   Verified 01/31/18 23:55


 


tamsulosin [From Flomax] Allergy   Verified 01/31/18 23:55











MAR Reviewed: Yes


Vital Signs: 


 Vital Signs











Temp  98.1 F   05/08/18 07:22


 


Pulse  82   05/08/18 07:22


 


Resp  16   05/08/18 07:22


 


BP  124/75   05/08/18 07:22


 


Pulse Ox  92 L  05/08/18 07:22











Progress Note: Exam





- Physical Exam


Constitutional: NAD


HEENT: PERRLA, moist MMs


Neck: no nodes, no JVD


Cardiovascular: RRR


Respiratory: clear to auscultation bilaterally


Gastrointestinal: soft, non-tender


Musculoskeletal: edema present


Neurological: non-focal, normal sensation, moves all 4 limbs


Lymphatic: no nodes


Psychiatric: normal affect, A&O x 3





Progress Note: Data





- Labs


Result Diagrams: 


 05/08/18 04:26





 05/08/18 04:26





Progress Note: A/P





- Problems


(1) Febrile illness, acute


Current Visit: Yes   Status: Acute   Code(s): R50.9 - FEVER, UNSPECIFIED   





(2) Chronic obstructive pulmonary disease


Current Visit: Yes   Status: Chronic   





(3) Sleep apnea in adult


Current Visit: No   Status: Chronic   Code(s): G47.33 - OBSTRUCTIVE SLEEP APNEA 

(ADULT) (PEDIATRIC)   





- Plan


Plan: 





Looks good.  Should be able to convert to oral antibiotics. If all goes well, 

he can likely go home Wed.

## 2018-05-09 NOTE — DIS
DATE OF ADMISSION:  05/07/2018

 

DATE OF DISCHARGE:  05/09/2018

 

CONDITION AT THE TIME OF DISCHARGE:  Stable and improved.

 

DISCHARGE DIAGNOSES:

1.  Acute respiratory failure, multifactorial in nature.

2.  Acute on chronic diastolic congestive heart failure.

3.  Chronic obstructive pulmonary disease exacerbation.

4.  Suspected pneumonia.

5.  Diabetes mellitus type 2.

6.  Dyslipidemia.

7.  Hypertension.

8.  Morbid obesity.

9.  Obstructive sleep apnea on nocturnal CPAP.

10.  Chronic respiratory failure on home oxygen.

 

DISCHARGE MEDICATIONS:  Remain the same as the home medications which are a multitude of medicines.  
Please see admission history and physical dictated by myself for the whole list.

 

NEW MEDICATIONS:  Omnicef 600 mg p.o. daily for 5 more days.

 

IN-HOUSE CONSULTATIONS:  Pulmonary Critical Care Medicine, Dr. Carroll.

 

PROCEDURES IN THE HOSPITAL:  Include chest x-ray upon admission, which showed pulmonary vascular ant
estion.

 

HOSPITAL COURSE:  Mr. Akil Dias is a 63-year-old male with known history of chronic respiratory 
failure due to chronic diastolic congestive heart failure, COPD and severe obstructive sleep apnea wh
o is a patient of Dr. Carroll, presented to the emergency room with complaints of worsening shortness
 of breath of few hours duration.  Upon presentation, he was hemodynamically stable, but somewhat tac
hycardic.  Initially, he required BiPAP in the emergency room which was later tapered off to nasal ca
nnula.  He was seen by his pulmonologist, Dr. Carroll in the emergency room.  He was found to have no
rmal BNP, but chest x-ray which was suggestive of mild fluid overload.  He was given IV Lasix in the 
ER as well.  There was also a component of COPD, so he was started on IV steroids, and empiric IV ant
ibiotic for possible pneumonitis as well.  Please see admission history and physical for further deta
ils.

 

HOSPITAL COURSE:  The patient was followed on a daily basis.  He was continued on gentle diuresis, IV
 steroids and IV antibiotics which were all later changed to oral.  He was continued on nebulizers an
d long-acting inhaled bronchodilators as well.  His home medications were restarted.  CPAP was provid
ed in the hospital at night.

 

He had rapid improvement in his symptoms and of this morning, he has been cleared by Pulmonary medici
ne to discharge as well.  He is back to his baseline and is eager to go home.

 

He was seen and examined this morning.

 

PHYSICAL EXAMINATION:

VITAL SIGNS:  Temperature 97.4, heart rate 82, blood pressure 122/82, respirations 16, saturating 95%
 on 2 liters nasal cannula.

GENERAL:  No acute distress, awake, alert, oriented x3.  He does get easily winded with conversation 
which he reports is baseline for him.

CHEST:  Clear to auscultation bilaterally without any significant wheezes or crackles, though it is d
ifficult to auscultate because of morbid obesity.

HEART:  Rate and rhythm is regular.

EXTREMITIES:  Show trace pitting edema bilaterally which is improved since admission.

 

LABORATORY DATA:  His serum chemistry showed potassium of 3.4, which will be replaced prior to discha
rge.  His serum creatinine is 1.86, which was 1.76 at the time of presentation and seems to be his ba
seline.  Blood sugar 296.

 

DISCHARGE INSTRUCTIONS:  He is instructed to follow up with Dr. Carroll in the Pulmonary Clinic as we
ll as with his primary care physician and he verbalized understanding.  Prescriptions were provided.

 

PRIMARY CARE PHYSICIAN:  Stephan Garcia D.O.

## 2018-05-09 NOTE — PDOC.PULPN
Progress Note: Subj/Obj





- Subjective


Date: 05/09/18


Time: 08:30


Narrative: Doing well.  No complaints.  Wants to go home





- ROS


All systems: reviewed and no additional remarkable complaints except as stated





- Objective


Allergies/Adverse Reactions: 


 Allergies











Allergy/AdvReac Type Severity Reaction Status Date / Time


 


levofloxacin [From Levaquin] Allergy   Verified 01/31/18 23:55


 


tamsulosin [From Flomax] Allergy   Verified 01/31/18 23:55











MAR Reviewed: Yes


Vital Signs: 


 Vital Signs











Temp  97.4 F L  05/09/18 07:15


 


Pulse  72   05/09/18 07:15


 


Resp  18   05/09/18 07:15


 


BP  122/82   05/09/18 07:15


 


Pulse Ox  91 L  05/09/18 07:15








 Intake & Output











 05/08/18 05/09/18 05/09/18





 18:59 06:59 18:59


 


Intake Total 816 610 


 


Output Total 1700 350 


 


Balance -884 260 


 


Weight  328 lb 6.4 oz 


 


Intake:   


 


  Intake, IV Amount  10 


 


  Oral 816 600 


 


Output:   


 


  Urine 1700 350 


 


Other:   


 


  Voiding Method Urinal Urinal 


 


  # Bowel Movements 1 0 














Progress Note: Exam





- Physical Exam


Constitutional: NAD


HEENT: PERRLA, moist MMs


Neck: no nodes


Cardiovascular: RRR


Respiratory: clear to auscultation bilaterally


Gastrointestinal: soft, non-tender


Musculoskeletal: edema present


Neurological: non-focal


Lymphatic: no nodes


Psychiatric: normal affect, A&O x 3


Skin: no rash





Progress Note: Data





- Labs


Result Diagrams: 


 05/08/18 04:26





 05/08/18 04:26





Progress Note: A/P





- Problems


(1) Febrile illness, acute


Current Visit: Yes   Status: Acute   Code(s): R50.9 - FEVER, UNSPECIFIED   





(2) Chronic obstructive pulmonary disease


Current Visit: Yes   Status: Chronic   





(3) Sleep apnea in adult


Current Visit: No   Status: Chronic   Code(s): G47.33 - OBSTRUCTIVE SLEEP APNEA 

(ADULT) (PEDIATRIC)   





- Plan


Plan: 





seems stable for dc


finish out abx at home

## 2018-05-18 NOTE — RAD
AP VIEW CHEST:

5/18/18

 

HISTORY: 

Cough. Weight gain.

 

AP view chest is obtained on 5/18/18.

 

Comparison made to previous exam from 5/7/18.

 

AP view chest demonstrates mild pulmonary vascular congestion. No evidence of effusions, pneumonia or
 pneumothorax seen. 

 

IMPRESSION:  

Pulmonary vascular congestion otherwise unremarkable AP view chest. 

 

POS: [facility]

## 2018-05-19 NOTE — HP
DATE OF ADMISSION:  05/19/2018

 

PRIMARY CARE PROVIDER:  Dr. Stephan Garcia.

 

CHIEF COMPLAINT:  Shortness of breath.

 

HISTORY OF PRESENT ILLNESS:  This is a 63-year-old  male with a significant history of chron
ic hypoxemic respiratory failure on chronic oxygen supplementation with 2 liters per minute by nasal 
cannula at home.  The patient was recently admitted to [facility] from 05/07/2
018 through 05/09/2018 for multifactorial dyspnea including acute on chronic hypoxemic respiratory fa
ilure as well as chronic obstructive pulmonary disease exacerbation.  The patient also with significa
nt diastolic heart failure on 40 mg b.i.d. of Lasix.  The patient states he was evaluated by [facility] and noted with 3-4 pound weight gain over a 24-hour period of time.  The patien
t states he has this frequently and has been told by his primary care provider to take extra doses of
 Lasix for 24-48 hours and to limit fluid intake.  The patient states he attempted to explain this to
 the home healthcare nurse; however, she insistent patient to seek medical attention for the weight g
ain.  The patient states actually felt fine at home and at baseline respiratory status.  The patient 
denied any change to his chronic medication regimen and states he has been compliant since discharge 
05/09/2018.  The patient does admit to some increased work of breathing with the change in weather as
 well as high pollen counts.  The patient does receive home health services as stated previously incl
uding physical therapy twice weekly.  The patient states he has had significant gains in strength and
 mobility with the physical therapy has been receiving at home.  The patient denied any specific ches
t pain, fever, chills or productive cough.  In the emergency department, the patient underwent genera
l evaluation including chest imaging showing pulmonary vascular prominence.  BNP was noted 66.  The p
atient received multiple treatments in the emergency room to include aspirin, Lasix, Solu-Medrol, and
 azithromycin.  The patient was transferred to the telemetry unit for evaluation.

 

PAST MEDICAL HISTORY:

1.  Acute on chronic hypoxemic respiratory failure with chronic oxygen supplementation at 2 liters pe
r minute nasal cannula.

2.  Obstructive sleep apnea with nocturnal CPAP.

3.  Coronary artery disease.

4.  Morbid obesity.

5.  Diastolic congestive heart failure.

6.  Chronic obstructive pulmonary disease.

7.  Diabetes mellitus type 2, insulin requiring.

8.  Chronic kidney disease stage 3.

9.  Hypertension.

10.  Dyslipidemia.

11.  Coronary artery disease status post myocardial infarction with stent placement.

12.  Pulmonary hypertension.

13.  Peripheral neuropathy.

 

PAST SURGICAL HISTORY:

1.  Status post cardiac catheterization.

2.  Status post sinus surgery.

3.  Status post cardiac stent placement.

 

CURRENT MEDICATIONS:

1.  Alendronate 70 mg p.o. q.7 days.

2.  Amlodipine 10 mg 1 tab p.o. daily.

3.  Enteric-coated aspirin 81 mg p.o. daily.

4.  Lipitor 20 mg p.o. at bedtime.

5.  BuSpar 5 mg p.o. b.i.d.

6.  Clonazepam 0.5 mg p.o. at bedtime.

7.  Plavix 75 mg 1 tab p.o. daily.

8.  Feosol 325 mg p.o. daily.

9.  Prozac 40 mg p.o. daily.

10.  Advair Diskus 1 inhalation b.i.d.

11.  Lasix 40 mg p.o. b.i.d.

12.  Gabapentin 300 mg p.o. t.i.d.

13.  Glipizide 10 mg p.o. b.i.d.

14.  Hydralazine 25 mg p.o. b.i.d.

15.  Norco 5/325 mg 1 tab p.o. b.i.d. p.r.n.

16.  Glargine insulin 47 units subcutaneously at bedtime.

17.  Humalog 14 units subcutaneously a.c.

18.  DuoNeb 3 mL nebulized q.i.d. p.r.n.

19.  Isosorbide mononitrate 30 mg p.o. daily.

20.  Lisinopril 10 mg p.o. daily.

21.  Losartan 25 mg p.o. b.i.d.

22.  Singulair 10 mg p.o. daily.

23.  Bystolic 5 mg p.o. daily.

24.  Protonix 40 mg p.o. daily.

25.  MiraLax 17 grams p.o. daily.

26.  Potassium chloride 40 mEq p.o. t.i.d.

27.  Prednisone 20 mg p.o. daily.

28.  Spironolactone 25 mg p.o. daily.

29.  Theophylline-24 200 mg p.o. b.i.d.

30.  Tramadol 50 mg p.o. b.i.d. p.r.n. pain.

31.  Trazodone 200 mg p.o. at bedtime.

32.  Incruse Ellipta one inhalation daily.

 

ALLERGIES:  LEVAQUIN and TAMSULOSIN.

 

FAMILY HISTORY:  Positive for coronary artery disease in father.  Mother with stroke.

 

SOCIAL HISTORY:  , residing in [place], [place].  Limited mobility due to deconditioning and body 
habitus.  Remote tobacco use.  No alcohol or illicit drug use.

 

REVIEW OF SYSTEMS:  The following complete review of systems was negative, unless otherwise mentioned
 in the HPI or below:

Constitutional:  Weight loss or gain, ability to conduct usual activities.

Skin:  Rash, itching.

Eyes:  Double vision, pain.

ENT/Mouth:  Nose bleeding, neck stiffness, pain, tenderness.

Cardiovascular:  Palpitations, dyspnea on exertion, orthopnea.

Respiratory:  Shortness of breath, wheezing, cough, hemoptysis, fever or night sweats.

Gastrointestinal:  Poor appetite, abdominal pain, heartburn, nausea, vomiting, constipation, or diarr
hea.

Genitourinary:  Urgency, frequency, dysuria, nocturia.

Musculoskeletal:  Pain, swelling.

Neurologic/Psychiatric:  Anxiety, depression.

Allergy/Immunologic:  Skin rash, bleeding tendency.

 

Otherwise negative except as stated per HPI.

 

PHYSICAL EXAMINATION:

VITAL SIGNS:  On admission blood pressure 162/93, pulse 71, respiratory rate 22, temperature 98.1 deg
susu Fahrenheit, O2 saturation 100% on room air.

GENERAL APPEARANCE:  This is a 63-year-old  male, alert and oriented x3, pleasant, conversan
t, in no acute distress.

HEENT:  Pupils are equal, round, and reactive to light and accommodation.  Extraocular muscles are in
tact.  No scleral icterus, no conjunctival injection.  Nares patent.  OP is clear.  Teeth in fair rep
air.

NECK:  Supple, no cervical adenopathy, no thyromegaly, no carotid bruits, no JVD appreciated.  Cervic
al spine with full active and passive range of motion.  No meningeal signs appreciated.

CHEST:  Diminished airflow bilaterally.  No crackles or rhonchi.  Occasional expiratory wheeze.

CARDIOVASCULAR:  S1 and S2 with distant heart sounds.

ABDOMEN:  Obese, soft.  Landmarks are difficult to palpate due to patient's body habitus.  No palpabl
e mass.  No rebound or guarding noted.

EXTREMITIES:  Pitting edema to the proximal shins bilaterally.  Pulses palpable distally at the dorsa
lis pedis, posterior tibial, and popliteal arteries bilaterally.  Capillary refill less than 2 second
s.

NEUROLOGIC:  Cranial nerves II-XII are grossly intact.  No focal or lateralizing signs appreciated.

 

PERTINENT LABORATORY DATA AND X-RAY FINDINGS:  Sodium 136, potassium 3.9, chloride 102, CO2 19, BUN 2
8, creatinine 1.85, estimated GFR 37, glucose 315, calcium 9.3, magnesium 2.0.  LFTs within normal li
mits.  Troponin I negative x3.  BNP 66.  CBC showed a white blood cell count of 13.7, hemoglobin 15, 
hematocrit 44, platelet count 203 with 81% neutrophils.  Portable chest x-ray dated 05/18/2018 showed
 pulmonary vascular prominence.  EKG dated 05/18/2018 by my interpretation shows a sinus mechanism wi
th heart rates in the 90s.  Normal R-wave progression noted in the precordial leads.  Normal axis.  N
o acute ST-T wave changes appreciated.

 

ASSESSMENT AND PLAN:

1.  Acute on chronic diastolic congestive heart failure exacerbation.  Mild overall.  We will continu
e Lasix 40 mg IV daily.  Continue Lasix 40 mg IV x1 dose now.  Monitor urine output and daily weight.
  Last echocardiogram in 08/2017 showed a preserved ejection fraction of 50%-55%.  Repeat 2D transtho
racic echocardiogram for current ejection fraction.

2.  Chronic hypoxemic respiratory failure - stable currently.  We will continue oxygen supplementatio
n at 2 liters per minute by nasal cannula baseline for the patient.  Resume general pulmonary support
brenden measures with Advair Diskus 1 inhalation b.i.d.  Continue prednisone 20 mg p.o. daily

3.  DuoNeb 3 mL nebulized q.i.d. p.r.n.  Continue Singulair 10 mg p.o. daily.

4.  Diabetes mellitus type 2, insulin requiring.  Continue insulin sliding scale for reflexive covera
ge.  Continue Glargine insulin 47 units at bedtime.  Insulin sliding scale.

5.  Hypertension.  Resume home antihypertensive regimen and monitor clinical response.

6.  Obstructive sleep apnea with nocturnal CPAP.  Continue nocturnal CPAP.

7.  Prophylaxis.  Sequential compression devices while in bed.  Protonix 40 mg p.o. daily.  Physical 
Therapy evaluation for functional assessment.

8.  Code status is FULL.  Surrogate medical decision maker is patient's spouse.

## 2018-05-20 NOTE — DIS
DATE OF ADMISSION:  05/19/2018

 

DATE OF DISCHARGE:  05/20/2018

 

ADMITTING DIAGNOSES:  Includes decompensated heart failure, shortness of breath, obstructive sleep ap
mari, coronary artery disease, morbid obesity, congestive heart failure, chronic obstructive pulmonary
 disease, diabetes mellitus type 2, stage 2 chronic kidney disease, dyslipidemia, and pulmonary hyper
tension.

 

DISCHARGE DIAGNOSES:  Decompensated heart failure, stable; chronic obstructive pulmonary disease, sta
ble; coronary artery disease, stable; morbid obesity; diabetes mellitus, type 2; chronic kidney disea
se, stage 3; hypoxia, stable; dyslipidemia, stable; pulmonary hypertension, stable.

 

HOSPITAL COURSE:  This is a 63-year-old male, who was admitted for volume overload.  The patient stat
es that he normally doubles up on his diuretics to help out with shortness of breath; however, the [facility] practitioner who is evaluating him at home stated that this was inappropriate and that he should 
go to the hospital.  The patient was admitted to internal medicine team, followed by internal medicin
e throughout his hospital duration and stay.  The patient had a chest x-ray done as well as an echoca
rdiogram performed.  The echo showed an ejection fraction of 50-55% with the left atrium that was dil
ated; however, the rest of the echo was relatively normal.  The patient at point in time of discharge
, denies any nausea, vomiting, diarrhea, constipation, chest pain, fevers, or shortness of breath.  T
he patient's blood pressure was stable, O2 saturation was 92% on room air.  The patient uses home oxy
gen at home.  The patient was heavily advised to pursue weight loss as aggressively as possible as if
 he is age of 63 and BMI is at 50.  Given the fact that he has a history of CAD, dysfunctional heart,
 diabetes mellitus, hypertension, and stage 3 CKD, he is a very high risk for dialysis and further pr
ogression of his heart.  Weight loss was advised and the patient was also advised to discuss with his
 primary care physicians to have a referral to cardiology and nephrology outpatient.  The patient at 
point in time of discharging again was stable, stated that he felt that he was back to baseline and s
hortness of breath was much improved, and stated that he would like to go home.  Wife was present at 
bedside.

 

DISPOSITION:  Home.

 

MEDICATIONS:  Resume home medications.  The patient is to double up on Lasix to try and target a spec
ific dry weight, his basal weight in case he gains more than 4 pounds in a 24-hour period.

 

DIET:  Low fat, low calorie, high fiber, low salt diet.

 

CONDITION:  Stable.

 

PROGNOSIS:  Guarded.

 

FOLLOWUP:  With PCP within 1 week.

 

ACTIVITY:  As tolerated with assistance as appropriate.

 

Once again, case and plan discussed with the patient and wife at length.  They understand and agree w
ith this plan.

## 2018-05-26 NOTE — HP
PRIMARY CARE PHYSICIAN:  Stephan Garcia D.O.

 

REASON FOR ADMISSION:  Acute kidney failure.

 

HISTORY OF PRESENT ILLNESS:  A 63-year-old male who has underlying history of COPD, asthma as well as
 obstructive sleep apnea on CPAP, who presented to emergency room with a complaint of generalized abd
ominal discomfort.  The patient was having generalized weakness.  The patient was not feeling good by
 himself.  The patient also reported that he was seeing things which were not there.  He was feeling 
dizziness.  He denies any fall.  He denies any trauma.  He denies any fever or chills.  He denies any
 nausea, vomiting, diarrhea.  He denies any pain anywhere in his body.

 

Today in the emergency room, patient had routine blood test, which showed creatinine 3.35.  His previ
ous creatinine was 2.01.  This patient was recently admitted in our hospital on 05/19/2018 and he was
 discharged home on 05/20/2018.  After discharge, the patient came to emergency room on 05/22/2018 an
d again today on 05/26/2018.  ER physician decided to keep this patient in hospital for acute kidney 
failure.  This patient already has chronic kidney disease stage 3.

 

REVIEW OF SYSTEMS:  The following complete review of systems was negative, unless otherwise mentioned
 in the HPI or below:  Constitutional:  Weight loss or gain, ability to conduct usual activities.  Sk
in:  Rash, itching. Eyes:  Double vision, pain.  ENT/Mouth:  Nose bleeding, neck stiffness, pain, ten
derness.  Cardiovascular:  Palpitations, dyspnea on exertion, orthopnea.  Respiratory:  Shortness of 
breath, wheezing, cough, hemoptysis, fever or night sweats.  Gastrointestinal:  Poor appetite, abdomi
nal pain, heartburn, nausea, vomiting, constipation, or diarrhea.  Genitourinary:  Urgency, frequency
, dysuria, nocturia.  Musculoskeletal:  Pain, swelling.  Neurologic/Psychiatric:  Anxiety, depression
.  Allergy/Immunologic:  Skin rash, bleeding tendency.  Please see my HPI for pertinent positive and 
negative.  All other review of systems reviewed and negative except as mentioned in the HPI.

 

CURRENT HOME MEDICATIONS:  Fosamax 70 mg every week, amlodipine 10 mg daily, aspirin 81 mg p.o. daily
, Lipitor 20 mg p.o. at bedtime, buspirone 5 mg p.o. b.i.d., clonazepam 0.5 mg p.o. at bedtime, Plavi
x 75 mg p.o. daily, ferrous sulfate 325 mg p.o. daily, Prozac 40 mg p.o. daily, Advair inhalation b.i
.d., Lasix 40 mg p.o. b.i.d., gabapentin 300 mg p.o. t.i.d., Glucotrol 10 mg p.o. b.i.d., hydralazine
 25 mg p.o. b.i.d., Lantus insulin 47 units subcu at bedtime, Humalog insulin as per sliding scale, D
uoNeb q.4 hourly p.r.n., Imdur 30 mg p.o. daily, lisinopril 10 mg p.o. daily, losartan 25 mg p.o. b.i
.d., Singulair 10 mg p.o. daily, MiraLax 17 grams p.o. daily, Bystolic 5 mg p.o. daily, Protonix 40 m
g p.o. daily, potassium chloride 20 mEq p.o. daily, Aldactone 25 mg p.o. daily, theophylline 200 mg p
.o. daily, tramadol 100 mg p.o. q.6 hourly p.r.n., and Breo Ellipta inhalation daily.

 

PAST MEDICAL HISTORY:  Chronic hypoxic respiratory failure on chronic oxygen therapy via nasal cannul
a; obstructive sleep apnea, on CPAP machine; coronary artery disease; chronic diastolic heart failure
; COPD/asthma; diabetes type 2, insulin requiring; chronic kidney disease stage 3; morbid obesity; hy
pertension; dyslipidemia; coronary artery disease with history of MI with stent placement; pulmonary 
hypertension; and peripheral neuropathy.

 

PAST SURGICAL HISTORY:  Cardiac catheterization with stent placement, sinus surgery, and LINQ monitor
 placed in 11/2016.

 

PAST PSYCHIATRIC HISTORY:  Anxiety disorder, depression.

 

ALLERGIES:  LEVOFLOXACIN and FLOMAX.

 

FAMILY HISTORY:  Positive for coronary artery disease to his father and mother had stroke.

 

SOCIAL HISTORY:  The patient is . Lives in [place] with his wife.  He has very limited mobility 
due to physical deconditioning and body habitus.  He denies any alcohol or other illicit drug abuse. 
 He smokes occasionally.

 

EMERGENCY ROOM COURSE:  The patient is given Novolin R 10 unit, IV fluid 500 mL.

 

PHYSICAL EXAMINATION:

VITAL SIGNS:  On arrival, blood pressure 124/86, pulse 87, respiratory rate 16, temperature 98.9, sat
uration 99% on room air, weight 143.3 kilograms.

GENERAL:  The patient is currently alert, awake, no obvious acute distress.

HEENT:  Head:  Normocephalic, atraumatic.  Eyes:  Pupils round, reactive to light.  Extraocular muscl
e intact.  ENT:  Oropharynx within normal limit.  Moist mucous membranes, no oral lesion, no pharynge
al erythema, no exudate.

NECK:  Supple, short neck.  Difficult to assess JVD, no thyromegaly, no carotid bruit.

LUNGS:  Clear to auscultation without any rhonchi or rales, though, obesity limiting examination.

CARDIAC:  S1, S2 appears regular.  Distant heart sound.  No murmur, no gallop, no rub.

ABDOMEN:  Morbid obesity limiting examination.  No peritoneal sign, no guarding, no rigidity, no rebo
und, no suprapubic tenderness.

BACK:  Unremarkable, no CVA tenderness.

EXTREMITIES:  Upper extremities:  Passive movement of all joints are normal.  Lower extremities:  No 
edema.  Good peripheral pulsation.

SKIN:  No skin rash.

HEMATOLOGICAL:  No lymphadenopathy.

PSYCHIATRIC:  Normal affect.

NEUROLOGIC:  The patient is moving all 4 limbs.  Grossly looking.  No focal neurological deficit note
d.

 

SIGNIFICANT LABORATORY DATA:  CBC:  WBC 10.7, hemoglobin 15.3, platelet 224.  BMP:  Sodium 132, potas
sium 5.0, chloride 91, carbon dioxide 26, anion gap 20, BUN 92, creatinine 3.35, glucose 426, calcium
 10.4.  LFT:  AST 12, ALT 18, alkaline phosphatase 86, albumin 4.3, CK-MB 2.3.  Troponin I less than 
0.010.  Ammonia level 23.

 

ASSESSMENT AND PLAN/IMPRESSION:

1.  Acute on chronic kidney failure, baseline chronic kidney disease stage 3.  The patient clinically
 appears dehydrated.  At this point, we will hold on Lasix therapy as well as lisinopril.  We will co
ntinue with the gentle IV fluid with NS at 70 mL per hour.  We will watch for any fluid overload.  We
 will monitor renal function and avoid nephrotoxin agents.

2.  Mild hyponatremia.  The patient has received NS at 70 mL per hour and we will repeat BMP tomorrow
.

3.  Hypertension.  We will verify the patient's home medication.  Based on the most recent discharge 
summary, the patient is on amlodipine 10 mg p.o. daily, hydralazine 25 mg p.o. b.i.d., Imdur 30 mg p.
o. daily, losartan 25 mg p.o. b.i.d., Bystolic 5 mg p.o. daily.  We will adjust blood pressure medica
tion based on hemodynamics.

4.  Chronic obstructive pulmonary disease.  Continue theophylline 200 mg p.o. daily, DuoNeb q.6 hourl
y, Dulera two puffs inhalation b.i.d.

5.  Anxiety and depression.  Continue clonazepam 0.5 mg p.o. at bedtime along with Prozac 40 mg p.o. 
daily.

6.  Coronary artery disease.  Continue aspirin 81 mg p.o. daily, Plavix 75 mg p.o. daily, Imdur 30 mg
 p.o. daily along with other blood pressure medication including losartan and Bystolic.

7.  Diabetes type 2.  Continue Glucotrol 10 mg p.o. b.i.d.  Diabetic diet will be given.  Insulin as 
per sliding scale per protocol.

8.  Osteoporosis.  Continue Fosamax 70 mg p.o. weekly after discharge.

9.  Dyslipidemia.  Continue Lipitor 20 mg p.o. at bedtime.

10.  Anemia, normocytic, normochromic.  Continue ferrous sulfate 325 mg p.o. daily.

11.  Chronic obstructive pulmonary disease/asthma.  Continue Singulair 10 mg p.o. daily, theophylline
 200 mg p.o. daily, DuoNeb therapy q.6 hourly and Dulera two puffs inhalation b.i.d.

12.  Deep venous thrombosis prophylaxis, heparin 5000 units subcu t.i.d.

13.  Gastrointestinal prophylaxis, Pepcid 20 mg p.o. daily.

 

CODE STATUS:  The patient is FULL CODE.  The patient's wife is surrogate decision maker.

 

Disposition plan based on clinical course.  We are expecting patient's stay in the hospital more than
 2 midnights.

 

Plan of care discussed with the patient in detail in the emergency room.

## 2018-05-27 NOTE — PRG
DATE OF SERVICE:  05/27/2018

 

SUBJECTIVE:  Patient seen and examined, feeling better, noted with the following vital signs.

 

PHYSICAL EXAMINATION:

VITAL SIGNS:  Afebrile, temperature 97.8, pulse 74, respiratory rate 20, O2 sat 92% with blood pressu
re 122/72.

HEENT:  Unremarkable.  Moist oral mucosa.

NECK:  Supple, no conjunctival injection or icterus.

CARDIOVASCULAR:  First and second heart sounds were heard.

RESPIRATORY:  Clear to auscultation.

DIGESTIVE:  Revealed a benign abdomen.

EXTREMITIES:  No peripheral edema.

SKIN:  No new gross rash.

LYMPHATICS:  No peripheral lymphadenopathy.

 

LABORATORY INVESTIGATIONS:  None.

 

IMPRESSION:  Acute on chronic kidney disease which seems to be improving with conservative measures.

 

PLAN:

1.  We will continue current renal supportive measures.

2.  Further management will be dependent on the clinical course.

3.  We will check this patient's chemistry tomorrow and hopefully there will be evidence of improveme
nt.

## 2018-05-27 NOTE — PDOC.PN
- Subjective


Encounter Start Date: 05/27/18


Encounter Start Time: 17:40


Subjective: f/u for JOSELINE/CKD tx with IVF's and avoidance of nephrotoxic meds. 

States 


-: feeling better overall. Appetite ok. Voiding ok. No N/V.





- Objective


Resuscitation Status: 


 











Resuscitation Status           FULL:Full Resuscitation














MAR Reviewed: Yes


Vital Signs & Weight: 


 Vital Signs (12 hours)











  Temp Pulse Resp BP BP Pulse Ox


 


 05/27/18 12:58   97  16   


 


 05/27/18 12:20  98.2 F  85  20   111/63  93 L


 


 05/27/18 09:15   72   132/78  


 


 05/27/18 08:10  97.9 F  72  20   132/78  94 L


 


 05/27/18 06:43   71  16   


 


 05/27/18 06:40       94 L


 


 05/27/18 06:34   71  16   








 Weight











Weight                         316 lb 6.4 oz














I&O: 


 











 05/26/18 05/27/18 05/28/18





 06:59 06:59 06:59


 


Intake Total 365 1278 


 


Output Total 700 3525 


 


Balance -313 -1685 











Result Diagrams: 


 05/26/18 04:10





 05/26/18 04:10


Additional Labs: 


 Accuchecks











  05/27/18 05/27/18 05/27/18





  16:27 11:29 06:25


 


POC Glucose  208 H  163 H  84














  05/26/18





  20:25


 


POC Glucose  195 H








 Laboratory Tests











  05/25/18 05/25/18 05/26/18





  23:22 23:22 04:10


 


Sodium  132 L  


 


BUN  92 H  


 


Creatinine  3.35 H  


 


Uric Acid    14.9 H


 


Phosphorus    5.7 H


 


Ammonia   23 


 


B-Natriuretic Peptide   














  05/26/18





  04:10


 


Sodium 


 


BUN 


 


Creatinine 


 


Uric Acid 


 


Phosphorus 


 


Ammonia 


 


B-Natriuretic Peptide  19.0











EKG Reviewed by me: Yes (Tele - SR in 70's)





Phys Exam





- Physical Examination


Constitutional: NAD


HEENT: PERRLA, moist MMs, sclera anicteric, oral pharynx no lesions


Neck: no nodes, no JVD, supple


diminished in bases, mild prolonged exp phase


Respiratory: no wheezing, no rhonchi


S1, S2


Cardiovascular: RRR, no significant murmur, no rub, gallop


Gastrointestinal: soft, non-tender, no distention, positive bowel sounds


Musculoskeletal: pulses present, edema present


Neurological: normal sensation, moves all 4 limbs


Psychiatric: normal affect, A&O x 3


Skin: no rash, normal turgor, cap refill <2 seconds





Dx/Plan


(1) JOSELINE (acute kidney injury)


Code(s): N17.9 - ACUTE KIDNEY FAILURE, UNSPECIFIED   Status: Acute   Comment: 

Improved, continue IVF but decrease 50ml/h, serial creatinine, avoid 

nephrotoxic meds and contrast   





(2) CKD (chronic kidney disease), stage III


Code(s): N18.3 - CHRONIC KIDNEY DISEASE, STAGE 3 (MODERATE)   Status: Chronic   

Comment: See above in #1   





(3) Hyponatremia


Code(s): E87.1 - HYPO-OSMOLALITY AND HYPONATREMIA   Status: Acute   Comment: 

Likely secondary to volume overload in context of JOSELINE, serial monitoring   





(4) DM type 2 (diabetes mellitus, type 2)


Status: Chronic   Comment: Continue Glipizide, Glargine and ISS, ADA   





(5) Hypertension


Code(s): I10 - ESSENTIAL (PRIMARY) HYPERTENSION   Status: Chronic   


Qualifiers: 


   Hypertension type: essential hypertension 


Comment: Continue Bystolic, Amlodipine, Spironolactone and Isosorbide, serial 

BP monitoring   





(6) Morbid obesity


Code(s): E66.01 - MORBID (SEVERE) OBESITY DUE TO EXCESS CALORIES   Status: 

Chronic   Comment: Dietitian consult   





- Plan


PT/OT, , respiratory therapy, DVT proph w/SCDs


Stable overall


-: Decrease IVF's 50ml/h


-: Avoid Lisinopril, Losartan and Lasix


-: OOB and ambulate


-: AM lab: BMP, CBC





* .

## 2018-05-27 NOTE — CON
DATE OF CONSULTATION:  05/26/2018

 

CONSULTING PHYSICIAN:  Dago Grissom M.D.

 

REQUESTING PHYSICIAN:  Jj Quinonez M.D.

 

REASON FOR CONSULTATION:  Acute on chronic kidney disease.

 

IMPRESSION:

1.  Acute on chronic kidney disease.  This is likely hemodynamically mediated as well as medication i
nduced in the context of poor p.o. intake while patient is on diuretics and ACE inhibitor.  One canno
t completely rule out drug-induced nephritis.  So, this patient has been in and out of the hospital r
ecently.

2.  Morbid obesity.

3.  Baseline chronic kidney disease, stage 3.

 

PLAN:

1.  Hold ACE inhibitor and diuretics for now.

2.  Gentle IV fluid resuscitation.

3.  Renally dose all medications and avoid potentially nephrotoxic agents.

4.  Further management will be dependent on the clinical course.

 

HISTORY OF PRESENT ILLNESS:  History is that of a 63-year-old gentleman, who has been in and out of [facility] recently, presented here again with not feeling well and having some visual hallucinations.  
On clinical evaluation, the patient noted with elevated creatinine above 3, when the patient's baseli
ne creatinine is around 2.  Of note, the patient has been on diuretics as well as ACE inhibitor.  The
 patient denies any nausea, vomiting, diarrhea; however, the patient has not been eating very well.  
As a result of these findings, a decision has been taken to involve Renal in the management of this c
ase.

 

PAST MEDICAL HISTORY:  Significant for chronic hypoxic respiratory failure in the context of COPD; ob
structive sleep apnea; diastolic heart failure; type 2 diabetes; morbid obesity; chronic kidney disea
se, stage 3; hypertension; coronary artery disease.

 

MEDICATIONS:  Reviewed as documented on [employer].

 

ALLERGIES:  LEVAQUIN and FLOMAX.

 

FAMILY HISTORY:  None significantly related to the presenting illness.

 

SOCIAL HISTORY:  The patient is .  No alcohol, no tobacco, no illicit drug use.

 

REVIEW OF SYSTEMS:  As documented in the body of the history.  All the other systems reviewed were fo
und not to be significantly related to the presenting illness.

 

LABORATORY INVESTIGATION:  On presentation significant for creatinine of 3.35, BUN of 92, sodium 132,
 blood sugar 426.

 

PHYSICAL EXAMINATION:

GENERAL:  On examination, the patient was found not to be in any obvious distress, noted with the fol
lowing vital signs.

VITAL SIGNS:  Afebrile with temperature 98.9, pulse 74, respiratory rate 20, O2 sat 93%-97% with bloo
d pressure 128/74.

HEENT EXAMINATION:  Unremarkable.  Moist oral mucosa.

NECK:  Supple, no conjunctival injection, no icterus.

CARDIOVASCULAR SYSTEM:  First and second heart sounds were heard.

RESPIRATORY SYSTEM:  Clear to auscultation.

DIGESTIVE SYSTEM:  Revealed an obese abdomen.

EXTREMITIES:  No peripheral edema.

SKIN EXAMINATION:  No new gross rash.

LYMPHATICS:  No peripheral lymphadenopathy.

 

SUMMARY:  A 63-year-old morbidly obese gentleman, who presented here with acute on chronic kidney dis
ease.

 

Thank you for this consultation.  We will follow with you.

## 2018-05-28 NOTE — PRG
DATE OF SERVICE:  05/28/2018

 

SUBJECTIVE:  The patient was seen and examined, seems to be doing much better.

 

PHYSICAL EXAMINATION:

VITAL SIGNS:  Afebrile, temperature 98.7, pulse 80, respiratory rate of 18, O2 sat 93% on 2 liters, b
lood pressure 121/65.

GENERAL:  The patient noted to be wheezing while trying to talk.

HEENT:  Unremarkable.  Moist oral mucosa.

CARDIOVASCULAR:  First and second heart sounds normal.

RESPIRATORY:  Reveals some diffuse rhonchi.

DIGESTIVE:  Revealed an obese abdomen with positive bowel sounds.

EXTREMITIES:  No peripheral edema.

SKIN:  No new gross rash.

LYMPHATICS:  No peripheral lymphadenopathy.

 

LABORATORY INVESTIGATIONS:  Showed a potassium of 2.7, creatinine down to 1.83.

 

IMPRESSION:

1.  Acute on chronic kidney disease seems to have this reverted back to baseline.

2.  Hypokalemia.

3.  Respiratory distress.

 

PLAN:

1.  Discontinue IV fluid.

2.  One time dose of loop diuretic.

3.  Continue current renal supportive measures.

## 2018-05-28 NOTE — PDOC.PN
- Subjective


Encounter Start Date: 05/28/18


Encounter Start Time: 12:30


Subjective: f/u for JOSELINE/CKD initially managed with IVF's and avoiding ACE-i/ARB'

s


-: and Lasix. Overall feeling better. No CP, SOB, fever.





- Objective


Resuscitation Status: 


 











Resuscitation Status           FULL:Full Resuscitation














Vital Signs & Weight: 


 Vital Signs (12 hours)











  Temp Pulse Resp BP BP Pulse Ox


 


 05/28/18 11:00  98.9 F  87  17   125/73  94 L


 


 05/28/18 08:31  98.4 F  78  19  135/76  135/76  92 L


 


 05/28/18 06:25   72  12   


 


 05/28/18 06:16       97


 


 05/28/18 06:15   72  16   


 


 05/28/18 03:46  96.4 F L  77  18   122/82  92 L








 Weight











Weight                         316 lb 6.4 oz














I&O: 


 











 05/27/18 05/28/18 05/29/18





 06:59 06:59 06:59


 


Intake Total 1278 2100 


 


Output Total 3525 1546 


 


Balance -2247 554 











Result Diagrams: 


 05/28/18 04:06





 05/28/18 04:06


Additional Labs: 


 Accuchecks











  05/28/18 05/28/18 05/27/18





  10:39 06:28 20:51


 


POC Glucose  147 H  91  211 H














  05/27/18





  16:27


 


POC Glucose  208 H








 Laboratory Tests











  05/25/18 05/25/18 05/26/18





  23:22 23:22 04:10


 


Sodium  132 L  


 


Potassium  5.0   4.1


 


BUN  92 H  


 


Creatinine  3.35 H   2.91 H


 


Uric Acid    14.9 H


 


Phosphorus    5.7 H


 


Ammonia   23 


 


B-Natriuretic Peptide   














  05/26/18





  04:10


 


Sodium 


 


Potassium 


 


BUN 


 


Creatinine 


 


Uric Acid 


 


Phosphorus 


 


Ammonia 


 


B-Natriuretic Peptide  19.0











EKG Reviewed by me: Yes (Tele - SR in 80's)





Phys Exam





- Physical Examination


Constitutional: NAD


HEENT: PERRLA, moist MMs, sclera anicteric, oral pharynx no lesions


Neck: no nodes, no JVD, supple, full ROM


prolonged exp phase


Respiratory: no wheezing, no rhonchi, clear to auscultation bilateral


S1, S2


Cardiovascular: RRR, no significant murmur, no rub, gallop


Gastrointestinal: soft, non-tender, no distention, positive bowel sounds


Musculoskeletal: pulses present, edema present


Neurological: non-focal, normal sensation, moves all 4 limbs


Psychiatric: normal affect, A&O x 3


Skin: normal turgor, cap refill <2 seconds





Dx/Plan


(1) JOSELINE (acute kidney injury)


Code(s): N17.9 - ACUTE KIDNEY FAILURE, UNSPECIFIED   Status: Acute   Comment: 

Improved, serial creatinine, avoid nephrotoxic meds and contrast   





(2) Hypokalemia


Code(s): E87.6 - HYPOKALEMIA   Status: Acute   Comment: KCL 40meq BID, serial 

monitoring   





(3) CKD (chronic kidney disease), stage III


Code(s): N18.3 - CHRONIC KIDNEY DISEASE, STAGE 3 (MODERATE)   Status: Chronic   

Comment: See above in #1   





(4) Hyponatremia


Code(s): E87.1 - HYPO-OSMOLALITY AND HYPONATREMIA   Status: Acute   Comment: 

Likely secondary to volume overload in context of JOSELINE, serial monitoring   





(5) DM type 2 (diabetes mellitus, type 2)


Status: Chronic   Comment: Continue Glipizide, Glargine and ISS, ADA   





(6) Hypertension


Code(s): I10 - ESSENTIAL (PRIMARY) HYPERTENSION   Status: Chronic   


Qualifiers: 


   Hypertension type: essential hypertension 


Comment: Continue Bystolic, Amlodipine, Spironolactone and Isosorbide, serial 

BP monitoring   





(7) Morbid obesity


Code(s): E66.01 - MORBID (SEVERE) OBESITY DUE TO EXCESS CALORIES   Status: 

Chronic   Comment: Dietitian consult   





- Plan


plan discussed w/ family, PT/OT, , out of bed/ambulate


Stable overall


-: KCL 40meq BID


-: Hold Lasix, Lisinopril and Losartan


-: Continue ASA and Plavix


-: PT for OOB/ambulation





* AM lab: BMP


* Home in am

## 2018-05-29 NOTE — DIS
DATE OF ADMISSION:  05/26/2018

 

DATE OF DISCHARGE:  05/29/2018

 

DISCHARGE DIAGNOSES:

1.  Acute kidney injury, iatrogenic, resolving.

2.  Hypokalemia.

3.  Chronic kidney disease, stage 3.

4.  Hyponatremia secondary to volume overload, improved.

5.  Diabetes mellitus, type 2, insulin requiring.

6.  Hypertension, stable.

7.  Morbid obesity.

8.  Chronic hypoxic respiratory failure on chronic oxygen supplementation.

9.  Obstructive sleep apnea, on nocturnal CPAP.

 

CONSULTATIONS:  Dr. Dago Grissom with Nephrology Service.

 

PERTINENT LABORATORY AND X-RAY FINDINGS:  Sodium ranged between 132-140, potassium ranged between 2.7
-5.0, creatinine ranged between 1.77-3.35.  Estimated GFR ranged between 19-39.  Uric acid level 14.9
.  LFTs within normal limits.  Troponin I negative x1.  BNP 19.  Hemoglobin ranged between 14.6-15.3.
  A 2D transthoracic echocardiogram dated 05/19/2018 showed ejection fraction of 50%-55%.  Left atria
l enlargement.

 

HOSPITAL COURSE:  The patient was initially admitted after presenting with generalized weakness and d
izziness with evidence of acute kidney injury.  The patient with longstanding history of chronic kidn
ey disease, stage 3, presenting with worsening renal failure, likely secondary to volume depletion an
d ongoing Lasix therapy with lisinopril and losartan.  The patient was placed on IV fluid hydration w
ith discontinuation of nephrotoxic agents.  The patient continued to clinically improve with volume r
eplacement with serial creatinine showing overall improving values with this intervention.  The patie
nt was noted with mild metabolic derangement including hyponatremia and hypokalemia, improving with s
upplementation as well as IV fluid hydration.  The patient was evaluated by the Nephrology Service wi
 recommendations for adjustment to his spironolactone to 50 mg daily.  The patient received ongoing
 potassium supplementations and was reduced on Lasix to 40 mg q.48 hours.  Overall, patient did remai
n clinically stable throughout the hospital course, tolerating regular oral intake and voiding approp
riately.  I have examined the patient on the time of discharge and discussed pertinent laboratory fin
dings as well as followup instructions.  The patient verbalizes understanding and agreement.  We will
 discharge home on 05/29/2018.

 

DISCHARGE MEDICATIONS:

1.  Alendronate 70 mg p.o. q.7 days.

2.  Norvasc 10 mg p.o. daily.

3.  Enteric-coated aspirin 81 mg p.o. daily.

4.  Lipitor 20 mg p.o. at bedtime.

5.  BuSpar 5 mg p.o. b.i.d.

6.  Clonazepam 0.5 mg p.o. at bedtime.

7.  Plavix 75 mg p.o. daily.

8.  Ferrous sulfate 325 mg p.o. daily.

9.  Prozac 40 mg p.o. daily.

10.  Advair Diskus 1 inhalation b.i.d.

11.  Lasix 40 mg p.o. q.48 hours.

12.  Gabapentin 300 mg p.o. t.i.d.

13.  Glipizide 10 mg p.o. b.i.d.

14.  Norco 5/325 mg 1 tab p.o. b.i.d. p.r.n.

15.  Glargine insulin 47 units subcutaneously at bedtime.

16.  Humalog KwikPen 14 units subcutaneously daily with sliding scale.

17.  DuoNeb 3 mL nebulized q.i.d. p.r.n.

18.  Isosorbide mononitrate 30 mg p.o. daily.

19.  Lisinopril 10 mg p.o. daily, hold until Nephrology followup.

20.  Losartan 25 mg p.o. b.i.d., hold until Nephrology followup.

21.  Singulair 10 mg p.o. daily.

22.  Bystolic 5 mg p.o. daily.

23.  Protonix 40 mg 1 tab p.o. daily.

24.  MiraLax 17 grams p.o. daily.

25.  Potassium chloride 40 mEq p.o. t.i.d.

26.  Prednisone 10 mg p.o. daily.

27.  Aldactone 50 mg p.o. daily.

28.  Kevin-24 200 mg p.o. b.i.d.

29.  Tramadol 50 mg p.o. b.i.d. p.r.n.

30.  Trazodone 200 mg p.o. at bedtime.

31.  Incruse Ellipta 62.5 mcg inhaled daily.

 

FOLLOWUP:  The patient may follow up with his primary care provider, Dr. Stephan Garcia, within 7 days of 
discharge.

 

CONDITION ON DISCHARGE:  Stable.

 

ACTIVITY:  Ad yvette.

 

DIET:  Heart healthy and ADA.

 

CODE STATUS:  FULL.

 

DISPOSITION:  Home, 05/29/2018.

 

Total time in preparing and coordinating discharge is 38 minutes.

## 2018-05-29 NOTE — PRG
DATE OF SERVICE:  05/29/2018

 

SUBJECTIVE:  Patient is seen and examined, seems to be doing much better.

 

PHYSICAL EXAMINATION:

VITAL SIGNS:  Afebrile, temperature 98.1, pulse 86, blood pressure 150/95, respiratory rate 16, O2 sa
t 98%.

HEENT:  Unremarkable with moist oral mucosa.  No conjunctival injection or icterus.

NECK:  Supple.

CARDIOVASCULAR:  First and second heart sounds were heard.

RESPIRATORY:  Clear to auscultation.

DIGESTIVE:  Revealed an obese abdomen.

EXTREMITIES:  No peripheral edema.

SKIN:  No new gross rash.

LYMPHATICS:  No peripheral lymphadenopathy.

 

LABORATORY INVESTIGATIONS:  Showed a creatinine of 1.77, potassium 2.8.

 

IMPRESSION:

1.  Acute on chronic kidney disease, which seems to have improved.

2.  Hypokalemia.

 

PLAN:

1.  Replete potassium.

2.  Increase the dose of spironolactone.

3.  Further management to be dependent on the clinical course.

## 2018-08-10 NOTE — RAD
CHEST TWO VIEWS:

 

History: Cough. 

 

Comparison: 5-18-18

 

FINDINGS: 

Cardiac silhouette is unremarkable. Pulmonary vasculature remains slightly engorged. Mediastinum is m
idline with anterior chest wall monitoring device. Calcified granulomata are consistent with healed g
ranulomatous disease. No confluent airspace consolidation, pneumothorax or pleural fluid. 

 

IMPRESSION: 

1. Borderline pulmonary vascular congestion, stable. 

2. No significant interval change.

 

POS: [facility]

## 2019-06-12 NOTE — RAD
3 views left RIBS:

6/12/2019



COMPARISON: None



HISTORY: Fall 2 days ago, left rib pain



FINDINGS: A Loop recorder overlies the upper left hemithorax. No displaced left-sided rib fracture is
 evident.



IMPRESSION: No displaced left rib fracture.



Reported By: Suhail Lindo 

Electronically Signed:  6/12/2019 11:51 AM

## 2019-10-21 NOTE — RAD
Frontal radiograph chest:

10/21/2019



COMPARISON: 5/18/2018



HISTORY: Cough, chest pain, COPD



FINDINGS: Stable increased linear interstitial density with pulmonary hyperinflation. Loop recorder o
verlies the mid left chest. No pneumothorax or pleural fluid. No focal consolidation or alveolar

edema.



IMPRESSION: Stable interstitial prominence and pulmonary hyperinflation, consistent with the provided
 history of COPD. No acute findings.



Reported By: Suhail Lindo 

Electronically Signed:  10/21/2019 1:19 PM

## 2020-05-29 NOTE — RAD
FRONTAL RADIOGRAPH CHEST:

 

DATE: 5/28/2020.

 

COMPARISON: 

10/21/2019.

 

HISTORY: 

Shortness of breath.

 

FINDINGS: 

Stable increased linear interstitial density noted with pulmonary hyperinflation.  Stable loop record
er overlies the left hilar shadow.  No pneumothorax, pleural fluid, focal consolidation, or alveolar 
edema.

 

IMPRESSION: 

Stable appearance of the chest.

 

POS: [facility]

## 2021-01-13 NOTE — RAD
XR Chest 1 View Portable



History: Dyspnea



Comparison: Radiograph September 2020



Findings: Loop recording device projects over the left hemithorax. Few foci are scattered scar within
 the lingula right middle lobe and right lower lobe. No pneumothorax. No effusion. No acute osseous

abnormality.



Impression: Chronic findings. No acute intrathoracic abnormality.



Reported By: Epifanio Garner 

Electronically Signed:  1/13/2021 4:02 PM

## 2021-01-13 NOTE — PDOC.HHP
Hospitalist HPI





- History of Present Illness


shortness of breath


History of Present Illness: 





Patient is a 65 year old  male with a PMH of LEESA, CAD, HFpEF and COPD 

on PRN O2 and BiPAP at home.  He follows up with a Pulmonologist at [facility].  He presents to the ER complaining of shortness of breath and 

cough for the past few days.  His symptoms have been progressively worsening 

with withish sputum production. He denies any fever, chills, night sweats or 

hemoptysis. 


ED Course: 





He was hemodynamically unstable on arrival and required volume repletion to 

stabilize his BP


Patient was PLACED on NIV after failing 3 rounds of nebulizer therapy


His CXR was without active infiltrates





During my encounter with the patient, he had his BIPAP machine on.  Vitals were 

stable on the monitor.  His dyspnea was nearly resolved as he was kept 

comfortable on BIPAP





- Exam


General - other findings: in mild respiratory distress


Eye: anicteric sclera


ENT: normocephalic atraumatic


Neck: supple, symmetric


Heart: RRR, no murmur, no gallops


Respiratory: rhonchi, wheezes


Gastrointestinal: soft, non-tender, non-distended


Extremities: no cyanosis, no clubbing, no edema


Neurological: cranial nerve grossly intact


Psychiatric: normal affect, normal behavior





Hospitalist Results





- Labs


Result Diagrams: 


                                 01/13/21 16:10





                                 01/13/21 16:10


Lab results: 


                                        











WBC  10.9 thou/uL (4.8-10.8)  H  01/13/21  16:10    


 


Hgb  16.4 g/dL (14.0-18.0)   01/13/21  16:10    


 


Hct  51.8 % (42.0-52.0)   01/13/21  16:10    


 


MCV  90.7 fL (78.0-98.0)   01/13/21  16:10    


 


Plt Count  229 thou/uL (130-400)   01/13/21  16:10    


 


Neutrophils %  41.6 % (42.0-75.0)  L  01/13/21  16:10    


 


ESR Westergren  14 mm/hr (Less than 20)   01/13/21  16:02    


 


Sodium  137 mmol/L (136-145)   01/13/21  16:10    


 


Potassium  4.4 mmol/L (3.5-5.1)   01/13/21  16:10    


 


Chloride  107 mmol/L ()   01/13/21  16:10    


 


Carbon Dioxide  22 mmol/L (23-31)  L  01/13/21  16:10    


 


BUN  15 mg/dL (8.4-25.7)   01/13/21  16:10    


 


Creatinine  1.18 mg/dL (0.7-1.3)   01/13/21  16:10    


 


Glucose  148 mg/dL ()  H  01/13/21  16:10    


 


Lactic Acid  2.3 mmol/L (0.5-2.2)  H  01/13/21  16:10    


 


Calcium  9.4 mg/dL (7.8-10.44)   01/13/21  16:10    


 


Total Bilirubin  0.4 mg/dL (0.2-1.2)   01/13/21  16:10    


 


AST  10 U/L (5-34)   01/13/21  16:10    


 


ALT  Less than  7 U/L (8-55)  L  01/13/21  16:10    


 


Alkaline Phosphatase  98 U/L ()   01/13/21  16:10    


 


Troponin I  Less than  0.010 ng/mL (< 0.028)   01/13/21  16:10    


 


C-Reactive Protein  0.86 mg/dL (= or < 0.5)  H  01/13/21  16:02    


 


B-Natriuretic Peptide  22.5 pg/mL (0-100)   01/13/21  16:10    


 


Serum Total Protein  6.9 g/dL (5.8-8.1)   01/13/21  16:10    


 


Albumin  3.9 g/dL (3.4-4.8)   01/13/21  16:10    














Hospitalist H&P A/P





- Problem


(1) Acute and chronic respiratory failure with hypoxia


Code(s): J96.21 - ACUTE AND CHRONIC RESPIRATORY FAILURE WITH HYPOXIA   Status: 

Acute   





(2) CKD (chronic kidney disease), stage III


Code(s): N18.3 - CHRONIC KIDNEY DISEASE, STAGE 3 (MODERATE) * DO NOT USE *   

Status: Chronic   





(3) COPD exacerbation


Code(s): J44.1 - CHRONIC OBSTRUCTIVE PULMONARY DISEASE W (ACUTE) EXACERBATION   

Status: Chronic   





(4) DM type 2 (diabetes mellitus, type 2)


Status: Chronic   





(5) Hypertension


Code(s): I10 - ESSENTIAL (PRIMARY) HYPERTENSION   Status: Chronic   


Qualifiers: 


   Hypertension type: essential hypertension   Qualified Code(s): I10 - 

Essential (primary) hypertension   





- Plan


Plan: 





Assessment


Patient is a 65 year old  male with LEESA, COPD, diastolic CHF, and CKD s

tage III.  He is admitted with acute COPD exacerbation. He is currently on BIPAP





Acute on chronic respiratory failure


COPD exacerbation


Lactic acidosis - 2.8


LEESA


CAD


Chronic diastolic CHF





PLAN:


Admit inpatient with telemetry


Start systemic corticosteroids along with DuoNebs 


BIPAP at bedtime


I would use ceftriaxone/azithromycin combination instead of levofloxacin since 

the patient has a hx of torsades de pointes


Sputum cultures


Repeat lactic acid in 4 hours


Resume home regimen: SSRI

## 2021-01-14 NOTE — CT
CT ANGIOGRAM THORAX WITH IV CONTRAST AND 3-D RECONSTRUCTIONS



CLINICAL INDICATION:

Difficulty breathing. Coughing. Masslike consolidation on prior CT exam. 



COMPARISON:

CT thorax on 9/9/2020



FINDINGS:

Pulmonary arteries: No filling defects are seen within the central or segmental pulmonary arteries. T
here is suboptimal opacification of the subsegmental pulmonary arteries for evaluation of pulmonary

emboli at this level.



Aorta: Vascular calcifications are seen in thoracic aorta with areas of eccentric atherosclerotic torrey
que seen in the descending thoracic aorta. Thoracic aorta is normal in caliber without evidence of

an aortic dissection.



Lungs: Linear densities are now present in the previously noted area of masslike consolidation in the
 left upper lobe likely related to residual scarring as there is minimal adjacent pleural

thickening present. Nodular parenchymal densities in the right lower lobe have resolved. There are re
sidual peripherally located linear densities at each lung base and lateral aspect right midlung

zone in both the right upper lobe and right middle lobes likely due to mild areas of chronic lung jumana
nge and scarring. There are linear mild groundglass densities in the anterolateral aspect of the

right upper lobe which is overall nonspecific and could also be attributable to chronic lung changes 
and associated volume loss. No new area of consolidation or pleural fluid is seen. No discrete

pulmonary nodule or mass is identified.



There are filling defects are seen within a view lower lobe bronchi bilaterally with mild peribronchi
al thickening present which may be related to bronchiolitis.



Mediastinum: Vascular calcifications are seen in the coronary arteries. Slight increase in number of 
mediastinal lymph nodes are seen but there are no enlarged lymph nodes seen by CT size criteria.



Thyroid gland: Not imaged on this exam.



Osseous structures: Degenerative changes are seen in the spine.



Chest wall: There is evidence of gynecomastia similar to prior exam.



Upper abdomen: Incomplete imaging of density area within the posterior aspect superior pole right kid
adriane is again seen statistically likely representing a cyst. 



IMPRESSION:



1. Mild peribronchial wall thickening suggesting bronchiolitis.

2. Filling defects within bilateral lower lobe bronchi which may be related to mucous plugging or mauro
ris.

3. Chronic lung changes and areas of volume loss. There are linear densities and pleural thickening i
n region of previously noted masslike density in the left upper lobe. Findings on the current study

are likely due to residual scarring.

4. No CT evidence for pulmonary embolus involving the central or segmental pulmonary arteries. Subseg
mental pulmonary arteries are not well evaluated due to timing of the contrast bolus.

5. Gynecomastia. 6. Incomplete imaging of a low-density lesion in the midportion right kidney. This w
as seen on prior exam as well.



Reported By: Stevie Solis 

Electronically Signed:  1/14/2021 3:38 PM

## 2021-01-14 NOTE — PDOC.HOSPP
- Subjective


Encounter Date: 01/14/21


Subjective: 


Patient transitioned from NIV to supplemental O2, currently on 3 L via NC.  





- Objective


Vital Signs & Weight: 


                             Vital Signs (12 hours)











  Temp Pulse Resp BP BP Pulse Ox


 


 01/14/21 11:59  97.6 F  116 H  20   113/64  100


 


 01/14/21 10:52   113 H  20    94 L


 


 01/14/21 10:00  97.9 F  108 H  24 H  134/75   94 L


 


 01/14/21 07:07   98  20    95


 


 01/14/21 04:53   108 H  24 H  110/80   92 L


 


 01/14/21 03:48  98.1 F   24 H    93 L








                                     Weight











Admit Weight                   215 lb 12.8 oz


 


Weight                         215 lb 12.8 oz














I&O: 


                                        











 01/13/21 01/14/21 01/15/21





 06:59 06:59 06:59


 


Intake Total  720 


 


Output Total  550 


 


Balance  170 











Result Diagrams: 


                                 01/13/21 16:10





                                 01/13/21 16:10


Additional Labs: 


                                   Accuchecks











  01/14/21 01/14/21 01/13/21





  10:53 05:47 23:21


 


POC Glucose  215 H  196 H  173 H














  01/13/21





  21:11


 


POC Glucose  168 H














Hospitalist ROS





- Medication


Medications: 


Active Medications











Generic Name Dose Route Start Last Admin





  Trade Name Freq  PRN Reason Stop Dose Admin


 


Hydrocodone Bitart/Acetaminophen  1 tab  01/14/21 02:25  01/14/21 02:39





  Hydrocodone/Acetaminophen 5/325 Mg Tablet  PO   1 tab





  Q4H PRN   Administration





  Pain  


 


Albuterol/Ipratropium  3 ml  01/13/21 18:30  01/14/21 10:52





  Ipratropium/Albuterol Sulfate 3 Ml Neb  NEB   3 ml





  [id number] BRIGITTE   Administration


 


Aspirin  81 mg  01/14/21 09:00  01/14/21 11:00





  Aspirin 81 Mg Enteric Coated Tablet  PO   81 mg





  DAILY BRIGITTE   Administration


 


Atorvastatin Calcium  20 mg  01/14/21 09:00  01/14/21 11:01





  Atorvastatin Calcium 20 Mg Tab  PO   20 mg





  DAILY BRIGITTE   Administration


 


Benzonatate  200 mg  01/13/21 21:00  01/14/21 11:00





  Benzonatate 100 Mg Cap  PO   200 mg





  TID BRIGITTE   Administration


 


Buspirone HCl  5 mg  01/13/21 21:00  01/14/21 11:01





  Buspirone Hcl 5 Mg Tab  PO   5 mg





  BID BRIGITTE   Administration


 


Clonazepam  0.5 mg  01/13/21 21:00  01/13/21 22:21





  Clonazepam 0.5 Mg Tab  PO   0.5 mg





  HS BRIGITTE   Administration


 


Clopidogrel Bisulfate  75 mg  01/14/21 09:00  01/14/21 11:00





  Clopidogrel Bisulfate 75 Mg Tab  PO   75 mg





  DAILY BRIGITTE   Administration


 


Cyclobenzaprine HCl  5 mg  01/13/21 21:00  01/14/21 11:01





  Cyclobenzaprine 10 Mg Tab  PO   5 mg





  TID BRIGITTE   Administration


 


Fluoxetine HCl  40 mg  01/14/21 09:00  01/14/21 11:00





  Fluoxetine Hcl 20 Mg Cap  PO   40 mg





  DAILY BRIGITTE   Administration


 


Guaifenesin/Dextromethorphan  15 ml  01/14/21 02:31  01/14/21 02:38





  Guaifenesin Dm 100-10/5 Ml Udcup  PO   15 ml





  Q4H PRN   Administration





  Cough  


 


Insulin Glargine 20 units/  0.2 mls @ 0 mls/hr  01/13/21 21:00  01/13/21 21:14





  Miscellaneous Medication  SC   Not Given





  HS Pending sale to [facility]  


 


Insulin Human Lispro  0 units  01/13/21 23:52  01/14/21 10:59





  Humalog 300 Units/3 Ml Vial  SC   3 unit





  .MILD SLIDING SCALE PRN   Administration





  Mild Correctional Scale  


 


Methylprednisolone Sodium Succinate  125 mg  01/13/21 20:00  01/14/21 03:40





  Methylprednisolone Sod Succ/Pf 125 Mg/2 Ml Vial  IVP   125 mg





  0400,1200,2000 BRIGITTE   Administration


 


Montelukast Sodium  10 mg  01/13/21 21:00  01/13/21 22:21





  Montelukast Sodium 10 Mg Tablet  PO   10 mg





  HS Pending sale to [facility]   Administration


 


Pantoprazole Sodium  40 mg  01/14/21 09:00  01/14/21 11:01





  Pantoprazole 40 Mg Tab  PO   40 mg





  DAILY BRIGITTE   Administration


 


Polyethylene Glycol  17 gm  01/14/21 09:00  01/14/21 10:59





  Polyethylene Glycol 3350 17 Gm Packet  PO   17 gm





  DAILY BRIGITTE   Administration


 


Venlafaxine HCl  150 mg  01/14/21 09:00  01/14/21 11:01





  Venlafaxine Hcl Xr 150 Mg Cap  PO   150 mg





  DAILY BRIGITTE   Administration














- Exam


General Appearance: awake alert


General - other findings: Mild respiratory distress


Eye: anicteric sclera


ENT: normocephalic atraumatic


Neck: supple


Heart: RRR, no murmur, no gallops, no rubs


Respiratory: wheezes


Respiratory - other findings: Diminished air movement, b/l.  


Extremities: no cyanosis, no clubbing, no edema


Neurological: cranial nerve grossly intact, normal sensation to touch


Musculoskeletal: normal tone, normal strength


Psychiatric: normal affect, normal behavior





Hosp A/P


(1) Acute and chronic respiratory failure with hypoxia


Code(s): J96.21 - ACUTE AND CHRONIC RESPIRATORY FAILURE WITH HYPOXIA   Status: 

Acute   





(2) CKD (chronic kidney disease), stage III


Code(s): N18.3 - CHRONIC KIDNEY DISEASE, STAGE 3 (MODERATE) * DO NOT USE *   

Status: Chronic   





(3) COPD exacerbation


Code(s): J44.1 - CHRONIC OBSTRUCTIVE PULMONARY DISEASE W (ACUTE) EXACERBATION   

Status: Chronic   





(4) DM type 2 (diabetes mellitus, type 2)


Status: Chronic   





(5) Hypertension


Code(s): I10 - ESSENTIAL (PRIMARY) HYPERTENSION   Status: Chronic   


Qualifiers: 


   Hypertension type: essential hypertension   Qualified Code(s): I10 - 

Essential (primary) hypertension   





- Plan





Assessment





Patient is a 65 year old  male with LEESA, COPD, diastolic CHF, and CKD 

stage III.  He is admitted with acute COPD exacerbation. Improving and 

transitioned from BIPAP to nasal cannula





Acute on chronic respiratory failure


COPD exacerbation


Lactic acidosis - 2.8


LEESA


CAD


Chronic diastolic CHF





PLAN:


Air entry still diminished.  I will continue scheduled solumedrol and DuoNeb


I will also add mucomyst nebulizer, and chest physiotherapy


Continue ceftriaxone and azithromycin. Abx so chosen due to documented hx of 

torsades de pointes


BIPAP at bedtime


Follow up sputum cultures


Repeat lactic acid today and bolus if needed


Continue insulin sliding as patient is on dexamethasone

## 2021-01-16 NOTE — PDOC.HOSPP
- Subjective


Encounter Date: 01/16/21


Subjective: 


Progressing well but slowly.  Still has shortness of breath with minimal 

exertion.  Chest physiotherapy has been ordered 





- Objective


Vital Signs & Weight: 


                             Vital Signs (12 hours)











  Temp Pulse Resp BP Pulse Ox


 


 01/16/21 08:11      98


 


 01/16/21 08:00  98 F  100  18  148/86 H  98


 


 01/16/21 04:00  97.3 F L  101 H  16  140/78  94 L


 


 01/16/21 02:06   106 H  18   96








                                     Weight











Admit Weight                   215 lb 12.8 oz


 


Weight                         215 lb 12.8 oz














I&O: 


                                        











 01/15/21 01/16/21 01/17/21





 06:59 06:59 06:59


 


Intake Total 400 1496 


 


Output Total 1000 452 


 


Balance -600 1044 











Result Diagrams: 


                                 01/15/21 11:29





                                 01/15/21 11:29


Additional Labs: 


                                   Accuchecks











  01/16/21 01/16/21 01/15/21





  11:19 06:07 16:58


 


POC Glucose  181 H  173 H  154 H














  01/14/21





  16:38


 


POC Glucose  150 H














Hospitalist ROS





- Medication


Medications: 


Active Medications











Generic Name Dose Route Start Last Admin





  Trade Name Freq  PRN Reason Stop Dose Admin


 


Hydrocodone Bitart/Acetaminophen  1 tab  01/14/21 02:25  01/16/21 08:17





  Hydrocodone/Acetaminophen 5/325 Mg Tablet  PO   1 tab





  Q4H PRN   Administration





  Pain  


 


Acetylcysteine  600 mg  01/14/21 13:00  01/16/21 06:57





  Acetylcysteine 20% 200 Mg/Ml 30 Ml Vial  INH   600 mg





  [id number] BRIGITTE   Administration


 


Albuterol/Ipratropium  3 ml  01/13/21 18:30  01/16/21 06:44





  Ipratropium/Albuterol Sulfate 3 Ml Neb  NEB   3 ml





  [id number] BRIGITTE   Administration


 


Amlodipine Besylate  5 mg  01/15/21 21:00  01/15/21 21:07





  Amlodipine 5 Mg Tab  PO   5 mg





  HS BRIGITTE   Administration


 


Aspirin  81 mg  01/14/21 09:00  01/16/21 08:19





  Aspirin 81 Mg Enteric Coated Tablet  PO   81 mg





  DAILY BRIGITTE   Administration


 


Atorvastatin Calcium  20 mg  01/14/21 09:00  01/16/21 08:18





  Atorvastatin Calcium 20 Mg Tab  PO   20 mg





  DAILY BRIGITTE   Administration


 


Benzonatate  200 mg  01/13/21 21:00  01/16/21 08:21





  Benzonatate 100 Mg Cap  PO   200 mg





  TID BRIGITTE   Administration


 


Buspirone HCl  5 mg  01/13/21 21:00  01/16/21 08:23





  Buspirone Hcl 5 Mg Tab  PO   5 mg





  BID BRIGITTE   Administration


 


Clonazepam  0.5 mg  01/13/21 21:00  01/15/21 21:07





  Clonazepam 0.5 Mg Tab  PO   0.5 mg





  HS BRIGITTE   Administration


 


Clopidogrel Bisulfate  75 mg  01/14/21 09:00  01/16/21 08:19





  Clopidogrel Bisulfate 75 Mg Tab  PO   75 mg





  DAILY BRIGITTE   Administration


 


Cyclobenzaprine HCl  5 mg  01/13/21 21:00  01/16/21 08:21





  Cyclobenzaprine 10 Mg Tab  PO   5 mg





  TID BRIGITTE   Administration


 


Fluoxetine HCl  40 mg  01/14/21 09:00  01/16/21 08:24





  Fluoxetine Hcl 20 Mg Cap  PO   40 mg





  DAILY BRIGITTE   Administration


 


Furosemide  20 mg  01/16/21 09:00  01/16/21 08:24





  Furosemide 20 Mg/2 Ml Vial  SLOW IVP   20 mg





  DAILY BRIGITTE   Administration


 


Guaifenesin/Dextromethorphan  15 ml  01/14/21 02:31  01/14/21 02:38





  Guaifenesin Dm 100-10/5 Ml Udcup  PO   15 ml





  Q4H PRN   Administration





  Cough  


 


Azithromycin 500 mg/ Sodium  250 mls @ 250 mls/hr  01/14/21 18:00  01/15/21 

18:35





  Chloride  IVPB   250 mls





  1800 BRIGITTE   Administration


 


Ceftriaxone Sodium 1 gm/  100 mls @ 200 mls/hr  01/14/21 16:00  01/15/21 17:56





  Sodium Chloride  IVPB   100 mls





  1600 BRIGITTE   Administration


 


Insulin Glargine 20 units/  0.2 mls @ 0 mls/hr  01/13/21 21:00  01/15/21 22:11





  Miscellaneous Medication  SC   0.2 mls





  HS BRIGITTE   Administration


 


Insulin Human Lispro  0 units  01/13/21 23:52  01/16/21 11:43





  Humalog 300 Units/3 Ml Vial  SC   2 unit





  .MILD SLIDING SCALE PRN   Administration





  Mild Correctional Scale  


 


Methylprednisolone Sodium Succinate  125 mg  01/13/21 20:00  01/16/21 11:44





  Methylprednisolone Sod Succ/Pf 125 Mg/2 Ml Vial  IVP   125 mg





  0400,1200,2000 BRIGITTE   Administration


 


Montelukast Sodium  10 mg  01/13/21 21:00  01/15/21 21:07





  Montelukast Sodium 10 Mg Tablet  PO   10 mg





  HS BRIGITTE   Administration


 


Pantoprazole Sodium  40 mg  01/14/21 09:00  01/16/21 08:19





  Pantoprazole 40 Mg Tab  PO   40 mg





  DAILY BRIGITTE   Administration


 


Polyethylene Glycol  17 gm  01/14/21 09:00  01/16/21 07:11





  Polyethylene Glycol 3350 17 Gm Packet  PO   Not Given





  DAILY BRIGITTE  


 


Sodium Chloride  10 ml  01/14/21 21:00  01/16/21 08:25





  Flush - Normal Saline 10 Ml Syringe  IVF   10 ml





  Q12HR BRIGITTE   Administration


 


Venlafaxine HCl  150 mg  01/14/21 09:00  01/16/21 08:23





  Venlafaxine Hcl Xr 150 Mg Cap  PO   150 mg





  DAILY BRIGITTE   Administration














- Exam


General - other findings: Moderate respiratory distress


Eye: PERRL


ENT: normocephalic atraumatic


Heart: RRR, no murmur, no gallops, no rubs


Respiratory: wheezes


Respiratory - other findings: Diminished air entry


Gastrointestinal: soft, non-tender, non-distended


Psychiatric: normal affect, normal behavior





Hosp A/P


(1) Acute and chronic respiratory failure with hypoxia


Code(s): J96.21 - ACUTE AND CHRONIC RESPIRATORY FAILURE WITH HYPOXIA   Status: 

Acute   





(2) CKD (chronic kidney disease), stage III


Code(s): N18.3 - CHRONIC KIDNEY DISEASE, STAGE 3 (MODERATE) * DO NOT USE *   

Status: Chronic   





(3) COPD exacerbation


Code(s): J44.1 - CHRONIC OBSTRUCTIVE PULMONARY DISEASE W (ACUTE) EXACERBATION   

Status: Chronic   





(4) DM type 2 (diabetes mellitus, type 2)


Status: Chronic   





(5) Hypertension


Code(s): I10 - ESSENTIAL (PRIMARY) HYPERTENSION   Status: Chronic   


Qualifiers: 


   Hypertension type: essential hypertension   Qualified Code(s): I10 - 

Essential (primary) hypertension   





- Plan





Assessment





Patient is a 65 year old  male with LEESA, COPD, diastolic CHF, and CKD 

stage III.  He is admitted with acute COPD exacerbation.  He has been 

transitioned from BiPAP to nasal cannula, currently on 2.5 L of oxygen.  

Continues to appear deconditioned and dyspneic and improving very slowly.  I had

ordered chest physiotherapy due to rhonchorous breath sounds and evidence of 

mucous plugs on CT chest





Acute on chronic respiratory failure


COPD exacerbation


Lactic acidosis - 2.8


LEESA


CAD


Chronic diastolic CHF


IDDM 


Physical deconditioning








PLAN:


Lasix trial with 20 mg IV x 1


Continue chest physiotherapy. I will add incentive spirometry today


Continue albuterol/ipratropium nebulizer and Mucomyst


I discussed plan of care with RT today


Continue systemic corticosteroids and empiric antibiotics 


I am avoiding quinolones due to documented history of torsade the point 


He has a CPAP at bedtime


I will put a case management consult for pulmonary rehab


Continue home dose of Lunesta for sleep


Continue insulin sliding as patient is on solumedrol

## 2021-01-16 NOTE — EKG
Test Reason : 

Blood Pressure : ***/*** mmHG

Vent. Rate : 093 BPM     Atrial Rate : 093 BPM

   P-R Int : 120 ms          QRS Dur : 092 ms

    QT Int : 366 ms       P-R-T Axes : 051 073 126 degrees

   QTc Int : 455 ms

 

Normal sinus rhythm

Low voltage QRS

Nonspecific ST and T wave abnormality

Abnormal ECG

 

Confirmed by SANDRA GARCIA, MELO BAILEY ([id number]),  SHAY THURSTON ([id number]) on 1/16/2021 10:09:21 AM

 

Referred By:             Confirmed By:MELO FLORES MD

## 2021-01-17 NOTE — PDOC.HOSPP
- Subjective


Encounter Date: 01/17/21


Subjective: 


Patient seen resting well in bed.  Tolerating minimal exertion like moving in 

and out of bed.  However, when asked to perform incentive spirometry, he starts 

coughing and becomes short of breath.  





- Objective


Vital Signs & Weight: 


                             Vital Signs (12 hours)











  Temp Pulse Resp BP Pulse Ox


 


 01/17/21 11:15   92  18  


 


 01/17/21 10:31  98.1 F  92  18  138/90  100


 


 01/17/21 08:00  97.7 F  97  16  132/74  96


 


 01/17/21 07:11   85  14  


 


 01/17/21 04:02  98.0 F  102 H  18  145/84 H  95


 


 01/17/21 01:43   102 H  18   96








                                     Weight











Admit Weight                   215 lb 12.8 oz


 


Weight                         215 lb 12.8 oz














I&O: 


                                        











 01/16/21 01/17/21 01/18/21





 06:59 06:59 06:59


 


Intake Total 1496 360 200


 


Output Total 452  


 


Balance 1044 360 200











Result Diagrams: 


                                 01/16/21 12:03





                                 01/15/21 11:29


Additional Labs: 


                                   Accuchecks











  01/17/21 01/17/21 01/16/21





  11:11 06:09 20:10


 


POC Glucose  166 H  174 H  171 H














Hospitalist ROS





- Medication


Medications: 


Active Medications











Generic Name Dose Route Start Last Admin





  Trade Name Freq  PRN Reason Stop Dose Admin


 


Hydrocodone Bitart/Acetaminophen  1 tab  01/14/21 02:25  01/16/21 23:01





  Hydrocodone/Acetaminophen 5/325 Mg Tablet  PO   1 tab





  Q4H PRN   Administration





  Pain  


 


Acetylcysteine  600 mg  01/14/21 13:00  01/17/21 07:11





  Acetylcysteine 20% 200 Mg/Ml 30 Ml Vial  INH   600 mg





  [id number] BRIGITTE   Administration


 


Albuterol/Ipratropium  3 ml  01/13/21 18:30  01/17/21 11:15





  Ipratropium/Albuterol Sulfate 3 Ml Neb  NEB   3 ml





  [id number] BRIGITTE   Administration


 


Amlodipine Besylate  5 mg  01/15/21 21:00  01/16/21 20:14





  Amlodipine 5 Mg Tab  PO   5 mg





  HS BRIGITTE   Administration


 


Aspirin  81 mg  01/14/21 09:00  01/17/21 09:41





  Aspirin 81 Mg Enteric Coated Tablet  PO   81 mg





  DAILY BRIGITTE   Administration


 


Atorvastatin Calcium  20 mg  01/14/21 09:00  01/17/21 09:41





  Atorvastatin Calcium 20 Mg Tab  PO   20 mg





  DAILY BRIGITTE   Administration


 


Benzonatate  200 mg  01/13/21 21:00  01/17/21 09:42





  Benzonatate 100 Mg Cap  PO   200 mg





  TID BRIGITTE   Administration


 


Buspirone HCl  5 mg  01/13/21 21:00  01/17/21 09:42





  Buspirone Hcl 5 Mg Tab  PO   5 mg





  BID BRIGITET   Administration


 


Clonazepam  0.5 mg  01/13/21 21:00  01/16/21 20:15





  Clonazepam 0.5 Mg Tab  PO   0.5 mg





  HS BRIGITTE   Administration


 


Clopidogrel Bisulfate  75 mg  01/14/21 09:00  01/17/21 09:42





  Clopidogrel Bisulfate 75 Mg Tab  PO   75 mg





  DAILY BRIGITTE   Administration


 


Cyclobenzaprine HCl  5 mg  01/13/21 21:00  01/17/21 09:42





  Cyclobenzaprine 10 Mg Tab  PO   5 mg





  TID BRIGITTE   Administration


 


Fluoxetine HCl  40 mg  01/14/21 09:00  01/17/21 09:47





  Fluoxetine Hcl 20 Mg Cap  PO   40 mg





  DAILY BRIGITTE   Administration


 


Furosemide  20 mg  01/16/21 09:00  01/17/21 09:48





  Furosemide 20 Mg/2 Ml Vial  SLOW IVP   20 mg





  DAILY BRIGITTE   Administration


 


Guaifenesin/Dextromethorphan  15 ml  01/14/21 02:31  01/14/21 02:38





  Guaifenesin Dm 100-10/5 Ml Udcup  PO   15 ml





  Q4H PRN   Administration





  Cough  


 


Azithromycin 500 mg/ Sodium  250 mls @ 250 mls/hr  01/14/21 18:00  01/16/21 

17:23





  Chloride  IVPB   250 mls





  1800 BRIGITTE   Administration


 


Ceftriaxone Sodium 1 gm/  100 mls @ 200 mls/hr  01/14/21 16:00  01/16/21 15:10





  Sodium Chloride  IVPB   100 mls





  1600 BRIGITTE   Administration


 


Insulin Glargine 20 units/  0.2 mls @ 0 mls/hr  01/13/21 21:00  01/16/21 20:15





  Miscellaneous Medication  SC   0.2 mls





  HS BRIGITTE   Administration


 


Insulin Human Lispro  0 units  01/13/21 23:52  01/17/21 06:14





  Humalog 300 Units/3 Ml Vial  SC   2 unit





  .MILD SLIDING SCALE PRN   Administration





  Mild Correctional Scale  


 


Methylprednisolone Sodium Succinate  125 mg  01/13/21 20:00  01/17/21 04:48





  Methylprednisolone Sod Succ/Pf 125 Mg/2 Ml Vial  IVP   125 mg





  0400,1200,2000 BRIGITTE   Administration


 


Montelukast Sodium  10 mg  01/13/21 21:00  01/16/21 20:15





  Montelukast Sodium 10 Mg Tablet  PO   10 mg





  HS BRIGITTE   Administration


 


Pantoprazole Sodium  40 mg  01/14/21 09:00  01/17/21 09:49





  Pantoprazole 40 Mg Tab  PO   40 mg





  DAILY BRIGITTE   Administration


 


Polyethylene Glycol  17 gm  01/14/21 09:00  01/17/21 09:49





  Polyethylene Glycol 3350 17 Gm Packet  PO   17 gm





  DAILY BRIGITTE   Administration


 


Sodium Chloride  10 ml  01/14/21 21:00  01/17/21 09:49





  Flush - Normal Saline 10 Ml Syringe  IVF   10 ml





  Q12HR BRIGITTE   Administration


 


Venlafaxine HCl  150 mg  01/14/21 09:00  01/17/21 09:49





  Venlafaxine Hcl Xr 150 Mg Cap  PO   150 mg





  DAILY BRIGITTE   Administration














- Exam


General - other findings: More comfortable while at rest


Eye: anicteric sclera


ENT: normocephalic atraumatic


Heart: RRR, no murmur, no gallops, no rubs


Respiratory: no wheezes, no ronchi


Respiratory - other findings: Better air movement throughout, but still 

diminished


Gastrointestinal: soft, non-tender, non-distended


Extremities: no clubbing, no edema


Psychiatric: normal affect, normal behavior





Hosp A/P


(1) Acute and chronic respiratory failure with hypoxia


Code(s): J96.21 - ACUTE AND CHRONIC RESPIRATORY FAILURE WITH HYPOXIA   Status: 

Acute   





(2) CKD (chronic kidney disease), stage III


Code(s): N18.3 - CHRONIC KIDNEY DISEASE, STAGE 3 (MODERATE) * DO NOT USE *   

Status: Chronic   





(3) COPD exacerbation


Code(s): J44.1 - CHRONIC OBSTRUCTIVE PULMONARY DISEASE W (ACUTE) EXACERBATION   

Status: Chronic   





(4) DM type 2 (diabetes mellitus, type 2)


Status: Chronic   





(5) Hypertension


Code(s): I10 - ESSENTIAL (PRIMARY) HYPERTENSION   Status: Chronic   


Qualifiers: 


   Hypertension type: essential hypertension   Qualified Code(s): I10 - Essenti

al (primary) hypertension   





- Plan





Assessment





Patient is a 65 year old  male with LEESA, COPD, diastolic CHF, and CKD 

stage III.  He is admitted with acute COPD exacerbation.  He has been 

transitioned from BiPAP to nasal cannula, currently on 2.5 L of oxygen via NC.  

Respiratory status improving but he will need intensive pulmonary rehab as he is

unable to successfully use his incentive spirometry.  I had ordered chest 

physiotherapy due to rhonchorous breath sounds and evidence of mucous plugs on 

CT chest





Acute on chronic respiratory failure


COPD exacerbation


Lactic acidosis - 2.8


LEESA


CAD


Chronic diastolic CHF


IDDM 


Physical deconditioning








PLAN:


Continue IV lasix


I will introduce symbicort to his regimen 


Plan to discharge on LABA/LAMA


Continue chest physiotherapy and incentive spirometry today


Continue albuterol/ipratropium nebulizer and Mucomyst


I am planning to transition him to PO steroid tomorrow


Continue antibiotics.  I am avoiding quinolones due to documented history of 

torsade the point 


Wean down supplemental O2 as tolerated


Continue BIPAP at bed time


Case management consult for pulmonary rehab


Continue home dose of Lunesta for sleep


Continue insulin sliding scale and scheduled lantus as patient is on solumedrol

## 2021-01-18 NOTE — PDOC.HOSPP
- Subjective


Encounter Date: 01/18/21


Subjective: 


Overnight.  Significant improvement over the past 24 hours.  Patient is now 

performing incentive spirometry successfully.  He still on 3 L of oxygen via 

nasal cannula but appears more comfortable in the settings.





- Objective


Vital Signs & Weight: 


                             Vital Signs (12 hours)











  Temp Pulse Pulse Pulse Resp BP BP


 


 01/18/21 11:31  98.0 F  78    20  


 


 01/18/21 09:44    73  86   153/90 H  142/89 H


 


 01/18/21 08:31       


 


 01/18/21 08:30  98.3 F  80    18  


 


 01/18/21 08:00       


 


 01/18/21 07:45  98.3 F  73    16  


 


 01/18/21 04:21   78     


 


 01/18/21 03:38  98.8 F  81    18  


 


 01/18/21 02:19   88     














  BP BP Pulse Ox Pulse Ox Pulse Ox


 


 01/18/21 11:31   154/89 H  99  


 


 01/18/21 09:44     98  95


 


 01/18/21 08:31    94 L  


 


 01/18/21 08:30  146/97 H   97  


 


 01/18/21 08:00    97  


 


 01/18/21 07:45   142/92 H  99  


 


 01/18/21 04:21    96  


 


 01/18/21 03:38  146/91 H   93 L  


 


 01/18/21 02:19    92 L  








                                     Weight











Admit Weight                   215 lb 12.8 oz


 


Weight                         215 lb 12.8 oz














I&O: 


                                        











 01/17/21 01/18/21 01/19/21





 06:59 06:59 06:59


 


Intake Total 360 1050 


 


Output Total  725 


 


Balance 360 325 











Result Diagrams: 


                                 01/16/21 12:03





                                 01/15/21 11:29


Additional Labs: 


                                   Accuchecks











  01/18/21 01/18/21 01/17/21





  10:57 04:54 20:40


 


POC Glucose  156 H  155 H  158 H














  01/17/21 01/16/21 01/15/21





  16:22 16:42 21:52


 


POC Glucose  154 H  150 H  181 H














Hospitalist ROS





- Medication


Medications: 


Active Medications











Generic Name Dose Route Start Last Admin





  Trade Name Freq  PRN Reason Stop Dose Admin


 


Hydrocodone Bitart/Acetaminophen  1 tab  01/14/21 02:25  01/17/21 23:58





  Hydrocodone/Acetaminophen 5/325 Mg Tablet  PO   1 tab





  Q4H PRN   Administration





  Pain  


 


Acetylcysteine  600 mg  01/14/21 13:00  01/18/21 08:30





  Acetylcysteine 20% 200 Mg/Ml 30 Ml Vial  INH   600 mg





  [id number] BRIGITTE   Administration


 


Albuterol/Ipratropium  3 ml  01/13/21 18:30  01/18/21 11:06





  Ipratropium/Albuterol Sulfate 3 Ml Neb  NEB   Not Given





  [id number] BRIGITTE  


 


Amlodipine Besylate  5 mg  01/15/21 21:00  01/17/21 20:44





  Amlodipine 5 Mg Tab  PO   5 mg





  HS BRIGITTE   Administration


 


Aspirin  81 mg  01/14/21 09:00  01/18/21 08:18





  Aspirin 81 Mg Enteric Coated Tablet  PO   81 mg





  DAILY BRIGITTE   Administration


 


Atorvastatin Calcium  20 mg  01/14/21 09:00  01/18/21 08:18





  Atorvastatin Calcium 20 Mg Tab  PO   20 mg





  DAILY BRIGITTE   Administration


 


Benzonatate  200 mg  01/13/21 21:00  01/18/21 08:18





  Benzonatate 100 Mg Cap  PO   200 mg





  TID BRIGITTE   Administration


 


Buspirone HCl  5 mg  01/13/21 21:00  01/18/21 08:18





  Buspirone Hcl 5 Mg Tab  PO   5 mg





  BID BRIGITTE   Administration


 


Clonazepam  0.5 mg  01/13/21 21:00  01/17/21 20:45





  Clonazepam 0.5 Mg Tab  PO   0.5 mg





  HS BRIGITTE   Administration


 


Clopidogrel Bisulfate  75 mg  01/14/21 09:00  01/18/21 08:18





  Clopidogrel Bisulfate 75 Mg Tab  PO   75 mg





  DAILY BRIGITTE   Administration


 


Cyclobenzaprine HCl  5 mg  01/13/21 21:00  01/18/21 08:18





  Cyclobenzaprine 10 Mg Tab  PO   5 mg





  TID BRIGITTE   Administration


 


Fluoxetine HCl  40 mg  01/14/21 09:00  01/18/21 08:19





  Fluoxetine Hcl 20 Mg Cap  PO   40 mg





  DAILY BRIGITTE   Administration


 


Furosemide  20 mg  01/16/21 09:00  01/18/21 08:20





  Furosemide 20 Mg/2 Ml Vial  SLOW IVP   20 mg





  DAILY BRIGITTE   Administration


 


Guaifenesin/Dextromethorphan  15 ml  01/14/21 02:31  01/14/21 02:38





  Guaifenesin Dm 100-10/5 Ml Udcup  PO   15 ml





  Q4H PRN   Administration





  Cough  


 


Azithromycin 500 mg/ Sodium  250 mls @ 250 mls/hr  01/14/21 18:00  01/17/21 

18:25





  Chloride  IVPB   250 mls





  1800 BRIGITTE   Administration


 


Ceftriaxone Sodium 1 gm/  100 mls @ 200 mls/hr  01/14/21 16:00  01/17/21 14:59





  Sodium Chloride  IVPB   100 mls





  1600 BRIGITTE   Administration


 


Insulin Glargine 20 units/  0.2 mls @ 0 mls/hr  01/13/21 21:00  01/17/21 20:45





  Miscellaneous Medication  SC   0.2 mls





  HS BRIGITTE   Administration


 


Insulin Human Lispro  0 units  01/13/21 23:52  01/18/21 05:10





  Humalog 300 Units/3 Ml Vial  SC   2 unit





  .MILD SLIDING SCALE PRN   Administration





  Mild Correctional Scale  


 


Methylprednisolone Sodium Succinate  125 mg  01/13/21 20:00  01/18/21 03:42





  Methylprednisolone Sod Succ/Pf 125 Mg/2 Ml Vial  IVP   125 mg





  0400,1200,2000 BRIGITTE   Administration


 


Mometasone Furoate/Formoterol Fumar  2 puff  01/17/21 18:30  01/18/21 08:46





  Mometasone 200 Mcg/Formoterol 5 Mcg 120 Puff Inhaler  INH   2 puff





  BID-RT BRIGITTE   Administration


 


Montelukast Sodium  10 mg  01/13/21 21:00  01/17/21 20:45





  Montelukast Sodium 10 Mg Tablet  PO   10 mg





  HS BRIGITTE   Administration


 


Pantoprazole Sodium  40 mg  01/14/21 09:00  01/18/21 08:20





  Pantoprazole 40 Mg Tab  PO   40 mg





  DAILY BRIGITTE   Administration


 


Polyethylene Glycol  17 gm  01/14/21 09:00  01/18/21 08:21





  Polyethylene Glycol 3350 17 Gm Packet  PO   Not Given





  DAILY BRIGITTE  


 


Sodium Chloride  10 ml  01/14/21 21:00  01/18/21 08:21





  Flush - Normal Saline 10 Ml Syringe  IVF   10 ml





  Q12HR BRIGITTE   Administration


 


Venlafaxine HCl  150 mg  01/14/21 09:00  01/18/21 08:21





  Venlafaxine Hcl Xr 150 Mg Cap  PO   150 mg





  DAILY BRIGITTE   Administration














- Exam


General - other findings: More alert and awake.  Less dyspneic


Eye: anicteric sclera


ENT: normocephalic atraumatic


Neck: supple


Heart: RRR, no murmur, no gallops, no rubs


Respiratory: no wheezes, no rales, no ronchi


Respiratory - other findings: Slightly diminished air movement in both lung 

fields


Gastrointestinal: soft, non-tender, non-distended


Extremities: no clubbing, no edema


Neurological: cranial nerve grossly intact


Musculoskeletal - other findings: Slightly deconditioned


Psychiatric: normal affect, normal behavior





Hosp A/P


(1) Acute and chronic respiratory failure with hypoxia


Code(s): J96.21 - ACUTE AND CHRONIC RESPIRATORY FAILURE WITH HYPOXIA   Status: 

Acute   





(2) CKD (chronic kidney disease), stage III


Code(s): N18.3 - CHRONIC KIDNEY DISEASE, STAGE 3 (MODERATE) * DO NOT USE *   

Status: Chronic   





(3) COPD exacerbation


Code(s): J44.1 - CHRONIC OBSTRUCTIVE PULMONARY DISEASE W (ACUTE) EXACERBATION   

Status: Chronic   





(4) DM type 2 (diabetes mellitus, type 2)


Status: Chronic   





(5) Hypertension


Code(s): I10 - ESSENTIAL (PRIMARY) HYPERTENSION   Status: Chronic   


Qualifiers: 


   Hypertension type: essential hypertension   Qualified Code(s): I10 - 

Essential (primary) hypertension   





- Plan





Assessment





Patient is a 65 year old  male with LEESA, COPD, diastolic CHF, and CKD 

stage III.  He is admitted with acute COPD exacerbation.  He has been 

transitioned from BiPAP to nasal cannula, currently on 2.5 L of oxygen via NC.  

Respiratory status improving on chest physiotherapy and incentive spirometry.  

CT chest had evidence of mucous plugs.  I have recommended pulmonary rehab but 

the patient declined.  He has home health services and would like to resume it 

upon discharge.  Would prefer to do additional rehab as outpatient





Acute on chronic respiratory failure


COPD exacerbation


Lactic acidosis - 2.8


LEESA


CAD


Chronic diastolic CHF


IDDM 


Physical deconditioning








PLAN:


Transition from IV to oral steroid


Continue duo nebs, antibiotics and Symbicort


Continue chest physiotherapy and incentive spirometry today


It looks like patient will most likely be discharged on home oxygen


Goal will be to wean him to 2 L in the next 24 hours


He has a CPAP at home


Continue insulin sliding scale and scheduled lantus as patient is on solumedrol

## 2021-01-19 NOTE — PDOC.DS.DS
Provider





- Provider


Date of Admission: 


01/13/21 17:31





Date of Discharge: 01/19/21


Admitting Provider: 


Prince SANDI Lynne MD





Primary Care Physician: 


Stephan Garcia DO








Course





- Hospital Course


Hospital Course: 


Patient is a 65 year old  male with LEESA on CPAP at home, COPD, 

diastolic CHF, and CKD stage III who was admitted with acute COPD exacerbation. 

He required BiPAP on admission and has been successfully transitioned to nasal 

cannula, currently on 2 L of oxygen via NC.  He is maintaining O2 saturation 

above 90%.  CT chest had evidence of mucous plugs, no pulmonary embolism.  His 

respiratory status improved on chest physiotherapy and incentive spirometry.   I

have recommended pulmonary rehab but the patient declined.  He has home health 

services and would like to resume it upon discharge.  Would prefer to do 

additional rehab as outpatient.  He will be discharged on supplemental O2, 

prolonged steroid, and LABA/LAMA for chronic maintenance of COPD.  





- Labs


Lab Results: 


                                        





                                 01/16/21 12:03 





                                 01/15/21 11:29 





Microbiology - Entire Visit





01/13/21 16:13   Venous blood - Right Hand   Blood Culture - Final


                            NO GROWTH IN 5 DAYS


01/13/21 16:24   Venous blood - Left Arm   Blood Culture - Final


                            NO GROWTH IN 5 DAYS











- Physical Exam


Vitals: 


                             Vital Signs (12 hours)











  Temp Pulse Resp BP BP Pulse Ox


 


 01/19/21 11:01   76  16    97


 


 01/19/21 10:58   76  16    97


 


 01/19/21 07:51  98 F  69  20   141/91 H  99


 


 01/19/21 07:21       97


 


 01/19/21 04:17  99.0 F  76  20  143/93 H   95


 


 01/19/21 00:00  96.4 F L  93  18   140/90  99








                                     Weight











Admit Weight                   215 lb 12.8 oz


 


Weight                         215 lb 12.8 oz














Physical Exam: 


The patient was seen and examined on the day of discharge.





General: obese.  Alert and awake


HEENT: Head atraumatic, normocephalic.  EOM intact


Pulmonary: No wheezing or crackles


CVS: Normal S1 and S2.  Regular rate.


Abdomen: Soft, nontender, nondistended


Extremities: Without edema.  Good range of motion in all extremities


Skin: Warm and well perfused


Psych: Mood and affect appropriate.


Neuro: Grossly intact





Problem





- Discharge Plan


Assessment: 


Please refer to hospital course





- Problem


(1) Acute and chronic respiratory failure with hypoxia


Code(s): J96.21 - ACUTE AND CHRONIC RESPIRATORY FAILURE WITH HYPOXIA   Status: 

Acute   





(2) CKD (chronic kidney disease), stage III


Code(s): N18.3 - CHRONIC KIDNEY DISEASE, STAGE 3 (MODERATE) * DO NOT USE *   

Status: Chronic   





(3) COPD exacerbation


Code(s): J44.1 - CHRONIC OBSTRUCTIVE PULMONARY DISEASE W (ACUTE) EXACERBATION   

Status: Chronic   





(4) DM type 2 (diabetes mellitus, type 2)


Status: Chronic   





(5) Hypertension


Code(s): I10 - ESSENTIAL (PRIMARY) HYPERTENSION   Status: Chronic   


Qualifiers: 


   Hypertension type: essential hypertension   Qualified Code(s): I10 - 

Essential (primary) hypertension   





Plan





- Discharge Medications


Prescriptions: 


Mometasone/Formoterol 200/5 [Dulera 200 Mcg/5 Mcg Inhaler] 2 puff INH BID-RT #1 

inh


predniSONE 50 mg PO QAM-WM #7 tab


Tiotropium Bromide [Spiriva] 18 mcg IH DAILY #1 inhaler


Home Medications: 


                                        











 Medication  Instructions  Recorded  Confirmed  Type


 


Aspirin [Aspirin EC] 81 mg PO DAILY 05/07/18 09/09/20 History


 


Atorvastatin Calcium [Lipitor] 20 mg PO HS 05/07/18 09/09/20 History


 


Clopidogrel Bisulfate [Clopidogrel] 75 mg PO DAILY 05/07/18 01/16/21 History


 


FLUoxetine HCl [Prozac] 40 mg PO DAILY 05/07/18 09/09/20 History


 


Ferrous Sulfate [Feosol] 325 mg PO DAILY 05/07/18 09/09/20 History


 


Gabapentin [Neurontin] 600 mg PO TID 05/07/18 09/09/20 History


 


Montelukast Sodium [Singulair] 10 mg PO DAILY 05/07/18 01/16/21 History


 


Pantoprazole [Protonix] 40 mg PO DAILY 05/07/18 01/16/21 History


 


Polyethylene Glycol 3350 [Miralax] 17 gm PO DAILY 05/07/18 09/09/20 History


 


Theophylline Anhydrous [Kevin-24] 200 mg PO BID 05/07/18 09/09/20 History


 


busPIRone HCl [Buspar] 5 mg PO BID 05/07/18 09/09/20 History


 


clonazePAM 0.5 mg PO HS 05/07/18 01/16/21 History


 


Insulin Glargine,Hum.Rec.Anlog 20 units SQ HS 05/18/18 09/09/20 History





[Lantus]    


 


Insulin Lispro [Humalog Kwikpen 10 unit SQ ACHS 05/18/18 09/09/20 History





U-100]    


 


Ipratropium/Albuterol Sulfate 3 ml NEB QID PRN 05/18/18 09/09/20 History





[DuoNeb]    


 


Benzonatate 200 mg PO TID 09/09/20 09/09/20 History


 


Brompheniramine/Pseudoephed/Dm 10 ml PO Q4HR PRN 09/09/20 09/09/20 History





[Bromfed DM Cough Syrup]    


 


Cyclobenzaprine HCl 5 mg PO TID 09/09/20 09/09/20 History


 


Eszopiclone [Lunesta] 1 mg PO HS 09/09/20 09/09/20 History


 


Ondansetron [Zofran ODT] 4 mg PO Q6HR PRN 09/09/20 09/09/20 History


 


Promethazine HCl/Codeine 5 ml PO Q6H PRN 09/09/20 09/09/20 History





[Prometh-Codein 6.25-10 mg/5 ml]    


 


Spironolactone [Aldactone] 25 mg PO DAILY 09/09/20 01/16/21 History


 


Venlafaxine HCl [Venlafaxine HCl 150 mg PO DAILY 09/09/20 09/09/20 History





ER]    


 


Amlodipine [Norvasc] 2.5 mg PO DAILY #30 tab 09/14/20 01/16/21 Rx


 


Azithromycin [Zithromax] 250 mg PO DAILY #2 tab 09/14/20  Rx


 


Cefdinir [Omnicef] 600 mg PO DAILY #7 cap 09/14/20  Rx


 


traMADol HCl [Tramadol HCl] 50 mg PO BID 01/14/21 01/14/21 History


 


Isosorbide Mononitrate [Isosorbide 30 mg PO DAILY 01/16/21 01/16/21 History





Mononitrate ER]    


 


Losartan Potassium 50 mg PO DAILY 01/16/21 01/16/21 History


 


Nebivolol HCl [Bystolic] 5 mg PO DAILY 01/16/21 01/16/21 History


 


Atorvastatin Calcium [Lipitor] 20 mg PO DAILY  tab 01/19/21  Rx


 


Finasteride [Proscar] 5 mg PO HS  tab 01/19/21  Rx


 


Insulin Glargine [Lantus Vial] 20 units SC HS  vial 01/19/21  Rx


 


Mometasone/Formoterol 200/5 2 puff INH BID-RT #1 inh 01/19/21  Rx





[Dulera 200 Mcg/5 Mcg Inhaler]    


 


Tiotropium Bromide [Spiriva] 18 mcg IH DAILY #1 inhaler 01/19/21  Rx


 


predniSONE 50 mg PO QAM-WM #7 tab 01/19/21  Rx











Allergies: 








levofloxacin [From Levaquin] Allergy (Unknown, Verified 01/13/21 23:38)


   Dizziness


tamsulosin [From Flomax] Allergy (Unknown, Verified 01/13/21 23:38)


   Dizziness








- Follow up Plan


Referrals: 


Stephan Garcia DO [Primary Care Provider] - 


Disposition: HOME HEALTH





Quality





- Care Measures


CORE MEASURES:: N/A





- Stroke/TIA


Did you prescribe antithrombotic therapy?: No


Specify reason for no DC antithrombotic therapy: Treatment not indicated

## 2021-01-21 NOTE — PQF
Dear : Prince Maged                    Date  1/21/2021

Please exercise your independent, professional judgment in responding to the 
clarification form. 

Clinical indicators are provided on the bottom of this form for your review



Please check appropriate box(es):

[  ] Sepsis    

[  ] Severe sepsis with associated acute organ dysfunction: 

   [  ] Acute on chronic Respiratory Failure   

[  ] Septic Shock

[x] No sepsis

[  ] Other diagnosis, please specify ___________

[  ] Unable to determine



Physician Signature:                         Date/Time:



For continuity of documentation, please document condition throughout progress 
notes and discharge summary.  Thank You.



To be completed by CDI/Coding staff for physician review: 







 Present Clinical Indicators - Signs / Symptoms / Labs Results and Location in 

Medical Record

 

[ x ] VS: BP: 85/63, Pulse: 105, RR: 26, T: 98.1 ED Provider pg.2

 

[ x ] Tachycardic ED Provider pg.2

 

[ x ] hypotensive ED Provider pg.2

 

[ x ] Respiratory rate increased ED Provider pg.2

 

[ x ] Complicated by sepsis with hypotension ED Provider pg.3

 

[ x ] Sepsis ED Provider pg.4

 

[ x ] WBC: 10.9H, 13.9H, 10.1 Laboratory

 

[ x ] Lactic acid: 2.3H, 3.5H, 3.2H, 2.7H, 2.7H Laboratory

 

[ x ] Progressive worsening with whitish sputum production H and P pg.1

 

[ x ] Blood culture no growth for 5 days Microbiology

 

[ x ] Acute on chronic respiratory failure H and P pg.2

 

[ x ] Lactic Acidosis HP 01/13

 

Present Risk Factors                                                            
             Results and Location in Medical Record

 

[ x ] 65 years old H and P pg.1

 

[ x ] LEESA H and P pg.1

 

[ x ] COPD H and P pg.1

 

[ x ] Former Smoker ED Notes 01/13

 

[ x ] DM HP 01/13

 

[ x ] Obesity DS 01/19

 

Present Treatments                                                              
               Results and Location in Medical Record

 

[ x ] BiPAP H and P pg.1

 

[ x ] IV Fluids MAR

 

[ x ] Lactic acid monitoring Laboratory

 

[ x ] WBC Monitoring Laboratory

 

[ x ] Blood culture Microbiology

 

[ x ] Azithromycin 250mg PO MAR

 

[ x ] Azithromycin 500mg IV MAR

 

[ x ] Rocephin 1gm IV MAR





CDS/ Signature: Jaden Tong        Phone #: [phone] ext [phone]       Date 1/21/20



                 This is a permanent part of the Medical Record

[facility]